# Patient Record
Sex: MALE | Race: WHITE | NOT HISPANIC OR LATINO | Employment: FULL TIME | URBAN - METROPOLITAN AREA
[De-identification: names, ages, dates, MRNs, and addresses within clinical notes are randomized per-mention and may not be internally consistent; named-entity substitution may affect disease eponyms.]

---

## 2024-07-13 ENCOUNTER — APPOINTMENT (OUTPATIENT)
Dept: CT IMAGING | Facility: HOSPITAL | Age: 34
End: 2024-07-13
Payer: MEDICAID

## 2024-07-13 ENCOUNTER — HOSPITAL ENCOUNTER (INPATIENT)
Facility: HOSPITAL | Age: 34
LOS: 3 days | Discharge: HOME | End: 2024-07-16
Attending: EMERGENCY MEDICINE | Admitting: STUDENT IN AN ORGANIZED HEALTH CARE EDUCATION/TRAINING PROGRAM
Payer: MEDICAID

## 2024-07-13 DIAGNOSIS — E87.6 HYPOKALEMIA: ICD-10-CM

## 2024-07-13 DIAGNOSIS — K85.20 ALCOHOL-INDUCED ACUTE PANCREATITIS, UNSPECIFIED COMPLICATION STATUS: Primary | ICD-10-CM

## 2024-07-13 PROBLEM — K85.90 PANCREATITIS, ACUTE: Status: ACTIVE | Noted: 2024-07-13

## 2024-07-13 LAB
ALBUMIN SERPL-MCNC: 4.3 G/DL (ref 3.5–5.2)
ALBUMIN/GLOB SERPL: 1.5 G/DL
ALP SERPL-CCNC: 160 U/L (ref 39–117)
ALT SERPL W P-5'-P-CCNC: 66 U/L (ref 1–41)
ANION GAP SERPL CALCULATED.3IONS-SCNC: 16.9 MMOL/L (ref 5–15)
AST SERPL-CCNC: 110 U/L (ref 1–40)
BACTERIA UR QL AUTO: NORMAL /HPF
BASOPHILS # BLD AUTO: 0.03 10*3/MM3 (ref 0–0.2)
BASOPHILS NFR BLD AUTO: 0.4 % (ref 0–1.5)
BILIRUB SERPL-MCNC: 1.7 MG/DL (ref 0–1.2)
BILIRUB UR QL STRIP: ABNORMAL
BUN SERPL-MCNC: 7 MG/DL (ref 6–20)
BUN/CREAT SERPL: 8.5 (ref 7–25)
CALCIUM SPEC-SCNC: 9.8 MG/DL (ref 8.6–10.5)
CHLORIDE SERPL-SCNC: 101 MMOL/L (ref 98–107)
CLARITY UR: CLEAR
CO2 SERPL-SCNC: 21.1 MMOL/L (ref 22–29)
COLOR UR: ABNORMAL
CREAT SERPL-MCNC: 0.82 MG/DL (ref 0.76–1.27)
DEPRECATED RDW RBC AUTO: 52.1 FL (ref 37–54)
EGFRCR SERPLBLD CKD-EPI 2021: 119.7 ML/MIN/1.73
EOSINOPHIL # BLD AUTO: 0.01 10*3/MM3 (ref 0–0.4)
EOSINOPHIL NFR BLD AUTO: 0.1 % (ref 0.3–6.2)
ERYTHROCYTE [DISTWIDTH] IN BLOOD BY AUTOMATED COUNT: 15.2 % (ref 12.3–15.4)
ETHANOL UR QL: <0.01 %
GLOBULIN UR ELPH-MCNC: 2.9 GM/DL
GLUCOSE SERPL-MCNC: 146 MG/DL (ref 65–99)
GLUCOSE UR STRIP-MCNC: NEGATIVE MG/DL
HCT VFR BLD AUTO: 45.8 % (ref 37.5–51)
HGB BLD-MCNC: 16.9 G/DL (ref 13–17.7)
HGB UR QL STRIP.AUTO: NEGATIVE
HYALINE CASTS UR QL AUTO: NORMAL /LPF
IMM GRANULOCYTES # BLD AUTO: 0.03 10*3/MM3 (ref 0–0.05)
IMM GRANULOCYTES NFR BLD AUTO: 0.4 % (ref 0–0.5)
KETONES UR QL STRIP: ABNORMAL
LEUKOCYTE ESTERASE UR QL STRIP.AUTO: ABNORMAL
LIPASE SERPL-CCNC: 1213 U/L (ref 13–60)
LYMPHOCYTES # BLD AUTO: 1 10*3/MM3 (ref 0.7–3.1)
LYMPHOCYTES NFR BLD AUTO: 12.2 % (ref 19.6–45.3)
MAGNESIUM SERPL-MCNC: 1.4 MG/DL (ref 1.6–2.6)
MCH RBC QN AUTO: 34.3 PG (ref 26.6–33)
MCHC RBC AUTO-ENTMCNC: 36.9 G/DL (ref 31.5–35.7)
MCV RBC AUTO: 93.1 FL (ref 79–97)
MONOCYTES # BLD AUTO: 0.39 10*3/MM3 (ref 0.1–0.9)
MONOCYTES NFR BLD AUTO: 4.8 % (ref 5–12)
NEUTROPHILS NFR BLD AUTO: 6.75 10*3/MM3 (ref 1.7–7)
NEUTROPHILS NFR BLD AUTO: 82.1 % (ref 42.7–76)
NITRITE UR QL STRIP: POSITIVE
NRBC BLD AUTO-RTO: 0 /100 WBC (ref 0–0.2)
PH UR STRIP.AUTO: 6 [PH] (ref 5–8)
PLATELET # BLD AUTO: 135 10*3/MM3 (ref 140–450)
PMV BLD AUTO: 9.6 FL (ref 6–12)
POTASSIUM SERPL-SCNC: 2.9 MMOL/L (ref 3.5–5.2)
PROT SERPL-MCNC: 7.2 G/DL (ref 6–8.5)
PROT UR QL STRIP: ABNORMAL
RBC # BLD AUTO: 4.92 10*6/MM3 (ref 4.14–5.8)
RBC # UR STRIP: NORMAL /HPF
REF LAB TEST METHOD: NORMAL
SODIUM SERPL-SCNC: 139 MMOL/L (ref 136–145)
SP GR UR STRIP: 1.03 (ref 1–1.03)
SQUAMOUS #/AREA URNS HPF: NORMAL /HPF
UROBILINOGEN UR QL STRIP: ABNORMAL
WBC # UR STRIP: NORMAL /HPF
WBC NRBC COR # BLD AUTO: 8.21 10*3/MM3 (ref 3.4–10.8)

## 2024-07-13 PROCEDURE — 80053 COMPREHEN METABOLIC PANEL: CPT | Performed by: EMERGENCY MEDICINE

## 2024-07-13 PROCEDURE — 36415 COLL VENOUS BLD VENIPUNCTURE: CPT

## 2024-07-13 PROCEDURE — 25010000002 ONDANSETRON PER 1 MG: Performed by: EMERGENCY MEDICINE

## 2024-07-13 PROCEDURE — 85025 COMPLETE CBC W/AUTO DIFF WBC: CPT | Performed by: EMERGENCY MEDICINE

## 2024-07-13 PROCEDURE — 25010000002 MORPHINE PER 10 MG: Performed by: EMERGENCY MEDICINE

## 2024-07-13 PROCEDURE — 25810000003 LACTATED RINGERS SOLUTION: Performed by: EMERGENCY MEDICINE

## 2024-07-13 PROCEDURE — 82077 ASSAY SPEC XCP UR&BREATH IA: CPT | Performed by: EMERGENCY MEDICINE

## 2024-07-13 PROCEDURE — 99285 EMERGENCY DEPT VISIT HI MDM: CPT

## 2024-07-13 PROCEDURE — 83690 ASSAY OF LIPASE: CPT | Performed by: EMERGENCY MEDICINE

## 2024-07-13 PROCEDURE — 83735 ASSAY OF MAGNESIUM: CPT | Performed by: EMERGENCY MEDICINE

## 2024-07-13 PROCEDURE — 25510000001 IOPAMIDOL PER 1 ML: Performed by: EMERGENCY MEDICINE

## 2024-07-13 PROCEDURE — 74177 CT ABD & PELVIS W/CONTRAST: CPT

## 2024-07-13 PROCEDURE — 25010000002 HYDROMORPHONE 1 MG/ML SOLUTION: Performed by: EMERGENCY MEDICINE

## 2024-07-13 PROCEDURE — 81001 URINALYSIS AUTO W/SCOPE: CPT | Performed by: EMERGENCY MEDICINE

## 2024-07-13 RX ORDER — SODIUM CHLORIDE 0.9 % (FLUSH) 0.9 %
10 SYRINGE (ML) INJECTION EVERY 12 HOURS SCHEDULED
Status: DISCONTINUED | OUTPATIENT
Start: 2024-07-13 | End: 2024-07-16 | Stop reason: HOSPADM

## 2024-07-13 RX ORDER — BISACODYL 10 MG
10 SUPPOSITORY, RECTAL RECTAL DAILY PRN
Status: DISCONTINUED | OUTPATIENT
Start: 2024-07-13 | End: 2024-07-16 | Stop reason: HOSPADM

## 2024-07-13 RX ORDER — NITROGLYCERIN 0.4 MG/1
0.4 TABLET SUBLINGUAL
Status: DISCONTINUED | OUTPATIENT
Start: 2024-07-13 | End: 2024-07-16 | Stop reason: HOSPADM

## 2024-07-13 RX ORDER — SODIUM CHLORIDE 9 MG/ML
40 INJECTION, SOLUTION INTRAVENOUS AS NEEDED
Status: DISCONTINUED | OUTPATIENT
Start: 2024-07-13 | End: 2024-07-16 | Stop reason: HOSPADM

## 2024-07-13 RX ORDER — FAMOTIDINE 10 MG/ML
20 INJECTION, SOLUTION INTRAVENOUS ONCE
Status: COMPLETED | OUTPATIENT
Start: 2024-07-13 | End: 2024-07-13

## 2024-07-13 RX ORDER — ONDANSETRON 2 MG/ML
4 INJECTION INTRAMUSCULAR; INTRAVENOUS EVERY 6 HOURS PRN
Status: DISCONTINUED | OUTPATIENT
Start: 2024-07-13 | End: 2024-07-16 | Stop reason: HOSPADM

## 2024-07-13 RX ORDER — NICOTINE 21 MG/24HR
1 PATCH, TRANSDERMAL 24 HOURS TRANSDERMAL NIGHTLY
Status: DISCONTINUED | OUTPATIENT
Start: 2024-07-14 | End: 2024-07-16 | Stop reason: HOSPADM

## 2024-07-13 RX ORDER — BISACODYL 5 MG/1
5 TABLET, DELAYED RELEASE ORAL DAILY PRN
Status: DISCONTINUED | OUTPATIENT
Start: 2024-07-13 | End: 2024-07-16 | Stop reason: HOSPADM

## 2024-07-13 RX ORDER — ENOXAPARIN SODIUM 100 MG/ML
40 INJECTION SUBCUTANEOUS DAILY
Status: DISCONTINUED | OUTPATIENT
Start: 2024-07-14 | End: 2024-07-16 | Stop reason: HOSPADM

## 2024-07-13 RX ORDER — AMOXICILLIN 250 MG
2 CAPSULE ORAL 2 TIMES DAILY PRN
Status: DISCONTINUED | OUTPATIENT
Start: 2024-07-13 | End: 2024-07-16 | Stop reason: HOSPADM

## 2024-07-13 RX ORDER — POTASSIUM CHLORIDE 20 MEQ/1
40 TABLET, EXTENDED RELEASE ORAL EVERY 4 HOURS
Qty: 6 TABLET | Refills: 0 | Status: COMPLETED | OUTPATIENT
Start: 2024-07-13 | End: 2024-07-14

## 2024-07-13 RX ORDER — POLYETHYLENE GLYCOL 3350 17 G/17G
17 POWDER, FOR SOLUTION ORAL DAILY PRN
Status: DISCONTINUED | OUTPATIENT
Start: 2024-07-13 | End: 2024-07-16 | Stop reason: HOSPADM

## 2024-07-13 RX ORDER — SODIUM CHLORIDE 0.9 % (FLUSH) 0.9 %
10 SYRINGE (ML) INJECTION AS NEEDED
Status: DISCONTINUED | OUTPATIENT
Start: 2024-07-13 | End: 2024-07-16 | Stop reason: HOSPADM

## 2024-07-13 RX ORDER — ONDANSETRON 2 MG/ML
4 INJECTION INTRAMUSCULAR; INTRAVENOUS ONCE
Status: COMPLETED | OUTPATIENT
Start: 2024-07-13 | End: 2024-07-13

## 2024-07-13 RX ADMIN — POTASSIUM CHLORIDE 40 MEQ: 1500 TABLET, EXTENDED RELEASE ORAL at 22:17

## 2024-07-13 RX ADMIN — SODIUM CHLORIDE, POTASSIUM CHLORIDE, SODIUM LACTATE AND CALCIUM CHLORIDE 1000 ML: 600; 310; 30; 20 INJECTION, SOLUTION INTRAVENOUS at 18:32

## 2024-07-13 RX ADMIN — HYDROMORPHONE HYDROCHLORIDE 1 MG: 1 INJECTION, SOLUTION INTRAMUSCULAR; INTRAVENOUS; SUBCUTANEOUS at 18:33

## 2024-07-13 RX ADMIN — HYDROMORPHONE HYDROCHLORIDE 1 MG: 1 INJECTION, SOLUTION INTRAMUSCULAR; INTRAVENOUS; SUBCUTANEOUS at 23:25

## 2024-07-13 RX ADMIN — MORPHINE SULFATE 4 MG: 4 INJECTION, SOLUTION INTRAMUSCULAR; INTRAVENOUS at 20:42

## 2024-07-13 RX ADMIN — Medication 10 ML: at 23:26

## 2024-07-13 RX ADMIN — ONDANSETRON 4 MG: 2 INJECTION INTRAMUSCULAR; INTRAVENOUS at 18:33

## 2024-07-13 RX ADMIN — IOPAMIDOL 100 ML: 755 INJECTION, SOLUTION INTRAVENOUS at 22:12

## 2024-07-13 RX ADMIN — FAMOTIDINE 20 MG: 10 INJECTION INTRAVENOUS at 18:33

## 2024-07-13 NOTE — ED PROVIDER NOTES
"Subjective   History of Present Illness  34-year-old male with a reported history of recurrent pancreatitis states he thinks he is having another flareup today.  States it occurred about 2 hours ago and is having some epigastric discomfort associated with nausea and vomiting.  He denies diarrhea or melena or hematochezia or hematemesis.  He reports no fevers or chills or unusual ingestions.  He states he did drink alcohol 2 days ago due to it being his birthday but otherwise has been trying to abstain from alcohol.  Review of Systems    No past medical history on file.  Recurrent pancreatitis  No Known Allergies    No past surgical history on file.    No family history on file.    Social History     Socioeconomic History    Marital status: Single     Prior to Admission medications    Not on File     BP (!) 148/106   Pulse 103   Temp 98.7 °F (37.1 °C)   Resp 18   Ht 170.2 cm (67\")   Wt 95.3 kg (210 lb)   SpO2 98%   BMI 32.89 kg/m²         Objective   Physical Exam  General: Well-developed well-appearing, no acute distress, alert and appropriate  Eyes: sclera nonicteric  HEENT: Mucous membranes somewhat dry, no mucosal swelling  Neck: Supple, no nuchal rigidity no JVD  Respirations: Respirations nonlabored, equal breath sounds bilaterally, clear lungs  Heart regular rate and rhythm, no murmurs rubs or gallops,   Abdomen soft, mildly tender palpation epigastrium, no rebound or guarding, nondistended, no hepatosplenomegaly, no hernia, no mass, normal bowel sounds, no CVA tenderness  Extremities no clubbing cyanosis or edema, calves are symmetric and nontender  Neuro cranial nerves grossly intact, no focal limb deficits  Psych oriented, pleasant affect  Skin no rash, brisk cap refill  Procedures           ED Course      Results for orders placed or performed during the hospital encounter of 07/13/24   Comprehensive Metabolic Panel    Specimen: Blood   Result Value Ref Range    Glucose 146 (H) 65 - 99 mg/dL    BUN " 7 6 - 20 mg/dL    Creatinine 0.82 0.76 - 1.27 mg/dL    Sodium 139 136 - 145 mmol/L    Potassium 2.9 (L) 3.5 - 5.2 mmol/L    Chloride 101 98 - 107 mmol/L    CO2 21.1 (L) 22.0 - 29.0 mmol/L    Calcium 9.8 8.6 - 10.5 mg/dL    Total Protein 7.2 6.0 - 8.5 g/dL    Albumin 4.3 3.5 - 5.2 g/dL    ALT (SGPT) 66 (H) 1 - 41 U/L    AST (SGOT) 110 (H) 1 - 40 U/L    Alkaline Phosphatase 160 (H) 39 - 117 U/L    Total Bilirubin 1.7 (H) 0.0 - 1.2 mg/dL    Globulin 2.9 gm/dL    A/G Ratio 1.5 g/dL    BUN/Creatinine Ratio 8.5 7.0 - 25.0    Anion Gap 16.9 (H) 5.0 - 15.0 mmol/L    eGFR 119.7 >60.0 mL/min/1.73   Lipase    Specimen: Blood   Result Value Ref Range    Lipase 1,213 (H) 13 - 60 U/L   Urinalysis With Microscopic If Indicated (No Culture) - Urine, Clean Catch    Specimen: Urine, Clean Catch   Result Value Ref Range    Color, UA [place] (A) Yellow, Straw    Appearance, UA Clear Clear    pH, UA 6.0 5.0 - 8.0    Specific Gravity, UA 1.033 (H) 1.005 - 1.030    Glucose, UA Negative Negative    Ketones, UA Trace (A) Negative    Bilirubin, UA Small (1+) (A) Negative    Blood, UA Negative Negative    Protein,  mg/dL (2+) (A) Negative    Leuk Esterase, UA Small (1+) (A) Negative    Nitrite, UA Positive (A) Negative    Urobilinogen, UA 1.0 E.U./dL 0.2 - 1.0 E.U./dL   Ethanol    Specimen: Blood   Result Value Ref Range    Ethanol % <0.010 %   CBC Auto Differential    Specimen: Blood   Result Value Ref Range    WBC 8.21 3.40 - 10.80 10*3/mm3    RBC 4.92 4.14 - 5.80 10*6/mm3    Hemoglobin 16.9 13.0 - 17.7 g/dL    Hematocrit 45.8 37.5 - 51.0 %    MCV 93.1 79.0 - 97.0 fL    MCH 34.3 (H) 26.6 - 33.0 pg    MCHC 36.9 (H) 31.5 - 35.7 g/dL    RDW 15.2 12.3 - 15.4 %    RDW-SD 52.1 37.0 - 54.0 fl    MPV 9.6 6.0 - 12.0 fL    Platelets 135 (L) 140 - 450 10*3/mm3    Neutrophil % 82.1 (H) 42.7 - 76.0 %    Lymphocyte % 12.2 (L) 19.6 - 45.3 %    Monocyte % 4.8 (L) 5.0 - 12.0 %    Eosinophil % 0.1 (L) 0.3 - 6.2 %    Basophil % 0.4 0.0 - 1.5 %    Immature  Grans % 0.4 0.0 - 0.5 %    Neutrophils, Absolute 6.75 1.70 - 7.00 10*3/mm3    Lymphocytes, Absolute 1.00 0.70 - 3.10 10*3/mm3    Monocytes, Absolute 0.39 0.10 - 0.90 10*3/mm3    Eosinophils, Absolute 0.01 0.00 - 0.40 10*3/mm3    Basophils, Absolute 0.03 0.00 - 0.20 10*3/mm3    Immature Grans, Absolute 0.03 0.00 - 0.05 10*3/mm3    nRBC 0.0 0.0 - 0.2 /100 WBC   Magnesium    Specimen: Blood   Result Value Ref Range    Magnesium 1.4 (L) 1.6 - 2.6 mg/dL   Urinalysis, Microscopic Only - Urine, Clean Catch    Specimen: Urine, Clean Catch   Result Value Ref Range    RBC, UA 0-2 None Seen, 0-2 /HPF    WBC, UA 0-2 None Seen, 0-2 /HPF    Bacteria, UA None Seen None Seen /HPF    Squamous Epithelial Cells, UA 0-2 None Seen, 0-2 /HPF    Hyaline Casts, UA 3-6 None Seen /LPF    Methodology Automated Microscopy      CT Abdomen Pelvis With Contrast    Result Date: 7/13/2024  Impression: 1.Findings consistent with significant acute uncomplicated pancreatitis. No evidence of necrosis or focal drainable collection. No evidence of pancreatic ductal dilatation. 2.Significant hepatic steatosis. No overt features of cirrhosis.. Varicosities are present extending to the stomach likely related to portal hypertension. Mild splenomegaly is present. 3.Ancillary findings as described above. Electronically Signed: Olamide Levine MD  7/13/2024 10:18 PM EDT  Workstation ID: [id number]                                          Medical Decision Making  Differential diagnosis including pancreatitis, cholecystitis, peptic ulcer disease, ischemic bowel, bowel obstruction    Patient was ordered Dilaudid and Zofran for acute pain management.  He was ordered IV fluids.  He had some marginal improvement and was advised of findings.  He was ordered potassium and magnesium replacement.  Lab and CT findings consistent with acute pancreatitis likely related to his recent alcohol consumption.  Patient is agreeable to plan of admission for further care.  Case discussed  dominick Gonsalez with the hospitalist service who is agreeable plan of admission.            Problems Addressed:  Alcohol-induced acute pancreatitis, unspecified complication status: complicated acute illness or injury  Hypokalemia: complicated acute illness or injury    Amount and/or Complexity of Data Reviewed  Labs: ordered. Decision-making details documented in ED Course.     Details: Lipase elevated, if enzymes elevated likely related to his alcohol consumption, magnesium and potassium are low, urinalysis negative for bacteria, CBC shows no leukocytosis, there is mild thrombocytopenia  Radiology: ordered and independent interpretation performed.     Details: My independent interpretation of CT abdomen image peripancreatic inflammatory process, no bowel obstruction    Risk  OTC drugs.  Prescription drug management.  Decision regarding hospitalization.        Final diagnoses:   Alcohol-induced acute pancreatitis, unspecified complication status   Hypokalemia       ED Disposition  ED Disposition       ED Disposition   Decision to Admit    Condition   --    Comment   Level of Care: Telemetry [5]   Admitting Physician: LLANES ALVAREZ, CARLOS [[id number]]   Attending Physician: LLANES ALVAREZ, CARLOS [[id number]]                 No follow-up provider specified.       Medication List      No changes were made to your prescriptions during this visit.            Thanh Brown MD  07/13/24 2413       Thanh Brown MD  07/13/24 7942

## 2024-07-13 NOTE — Clinical Note
Level of Care: Telemetry [5]   Admitting Physician: LLANES ALVAREZ, CARLOS [[id number]]   Attending Physician: LLANES ALVAREZ, CARLOS [[id number]]

## 2024-07-14 LAB
ALBUMIN SERPL-MCNC: 3.7 G/DL (ref 3.5–5.2)
ALBUMIN/GLOB SERPL: 1.5 G/DL
ALP SERPL-CCNC: 131 U/L (ref 39–117)
ALT SERPL W P-5'-P-CCNC: 48 U/L (ref 1–41)
ANION GAP SERPL CALCULATED.3IONS-SCNC: 11.6 MMOL/L (ref 5–15)
AST SERPL-CCNC: 62 U/L (ref 1–40)
BASOPHILS # BLD AUTO: 0.01 10*3/MM3 (ref 0–0.2)
BASOPHILS NFR BLD AUTO: 0.2 % (ref 0–1.5)
BILIRUB SERPL-MCNC: 2.3 MG/DL (ref 0–1.2)
BUN SERPL-MCNC: 8 MG/DL (ref 6–20)
BUN/CREAT SERPL: 12.1 (ref 7–25)
CALCIUM SPEC-SCNC: 8.7 MG/DL (ref 8.6–10.5)
CHLORIDE SERPL-SCNC: 100 MMOL/L (ref 98–107)
CO2 SERPL-SCNC: 21.4 MMOL/L (ref 22–29)
CREAT SERPL-MCNC: 0.66 MG/DL (ref 0.76–1.27)
DEPRECATED RDW RBC AUTO: 55.4 FL (ref 37–54)
EGFRCR SERPLBLD CKD-EPI 2021: 127.8 ML/MIN/1.73
EOSINOPHIL # BLD AUTO: 0.02 10*3/MM3 (ref 0–0.4)
EOSINOPHIL NFR BLD AUTO: 0.3 % (ref 0.3–6.2)
ERYTHROCYTE [DISTWIDTH] IN BLOOD BY AUTOMATED COUNT: 15.9 % (ref 12.3–15.4)
GLOBULIN UR ELPH-MCNC: 2.4 GM/DL
GLUCOSE SERPL-MCNC: 153 MG/DL (ref 65–99)
HCT VFR BLD AUTO: 45 % (ref 37.5–51)
HGB BLD-MCNC: 15.8 G/DL (ref 13–17.7)
IMM GRANULOCYTES # BLD AUTO: 0.02 10*3/MM3 (ref 0–0.05)
IMM GRANULOCYTES NFR BLD AUTO: 0.3 % (ref 0–0.5)
LIPASE SERPL-CCNC: 1157 U/L (ref 13–60)
LYMPHOCYTES # BLD AUTO: 1.04 10*3/MM3 (ref 0.7–3.1)
LYMPHOCYTES NFR BLD AUTO: 17.2 % (ref 19.6–45.3)
MCH RBC QN AUTO: 33.7 PG (ref 26.6–33)
MCHC RBC AUTO-ENTMCNC: 35.1 G/DL (ref 31.5–35.7)
MCV RBC AUTO: 95.9 FL (ref 79–97)
MONOCYTES # BLD AUTO: 0.31 10*3/MM3 (ref 0.1–0.9)
MONOCYTES NFR BLD AUTO: 5.1 % (ref 5–12)
NEUTROPHILS NFR BLD AUTO: 4.64 10*3/MM3 (ref 1.7–7)
NEUTROPHILS NFR BLD AUTO: 76.9 % (ref 42.7–76)
NRBC BLD AUTO-RTO: 0 /100 WBC (ref 0–0.2)
PLATELET # BLD AUTO: 107 10*3/MM3 (ref 140–450)
PMV BLD AUTO: 9.9 FL (ref 6–12)
POTASSIUM SERPL-SCNC: 4 MMOL/L (ref 3.5–5.2)
PROT SERPL-MCNC: 6.1 G/DL (ref 6–8.5)
RBC # BLD AUTO: 4.69 10*6/MM3 (ref 4.14–5.8)
SODIUM SERPL-SCNC: 133 MMOL/L (ref 136–145)
WBC NRBC COR # BLD AUTO: 6.04 10*3/MM3 (ref 3.4–10.8)

## 2024-07-14 PROCEDURE — 25010000002 MAGNESIUM SULFATE 2 GM/50ML SOLUTION: Performed by: STUDENT IN AN ORGANIZED HEALTH CARE EDUCATION/TRAINING PROGRAM

## 2024-07-14 PROCEDURE — 25810000003 SODIUM CHLORIDE 0.9 % SOLUTION: Performed by: NURSE PRACTITIONER

## 2024-07-14 PROCEDURE — 25010000002 ENOXAPARIN PER 10 MG: Performed by: STUDENT IN AN ORGANIZED HEALTH CARE EDUCATION/TRAINING PROGRAM

## 2024-07-14 PROCEDURE — 83690 ASSAY OF LIPASE: CPT | Performed by: STUDENT IN AN ORGANIZED HEALTH CARE EDUCATION/TRAINING PROGRAM

## 2024-07-14 PROCEDURE — 25010000002 ONDANSETRON PER 1 MG: Performed by: STUDENT IN AN ORGANIZED HEALTH CARE EDUCATION/TRAINING PROGRAM

## 2024-07-14 PROCEDURE — 85025 COMPLETE CBC W/AUTO DIFF WBC: CPT | Performed by: STUDENT IN AN ORGANIZED HEALTH CARE EDUCATION/TRAINING PROGRAM

## 2024-07-14 PROCEDURE — 25010000002 HYDROMORPHONE 1 MG/ML SOLUTION: Performed by: STUDENT IN AN ORGANIZED HEALTH CARE EDUCATION/TRAINING PROGRAM

## 2024-07-14 PROCEDURE — 80053 COMPREHEN METABOLIC PANEL: CPT | Performed by: STUDENT IN AN ORGANIZED HEALTH CARE EDUCATION/TRAINING PROGRAM

## 2024-07-14 RX ORDER — MAGNESIUM SULFATE HEPTAHYDRATE 40 MG/ML
2 INJECTION, SOLUTION INTRAVENOUS
Status: COMPLETED | OUTPATIENT
Start: 2024-07-14 | End: 2024-07-14

## 2024-07-14 RX ORDER — MAGNESIUM SULFATE HEPTAHYDRATE 40 MG/ML
2 INJECTION, SOLUTION INTRAVENOUS
Status: CANCELLED | OUTPATIENT
Start: 2024-07-14 | End: 2024-07-14

## 2024-07-14 RX ORDER — SODIUM CHLORIDE 9 MG/ML
100 INJECTION, SOLUTION INTRAVENOUS CONTINUOUS
Status: DISCONTINUED | OUTPATIENT
Start: 2024-07-14 | End: 2024-07-15

## 2024-07-14 RX ADMIN — HYDROMORPHONE HYDROCHLORIDE 0.5 MG: 1 INJECTION, SOLUTION INTRAMUSCULAR; INTRAVENOUS; SUBCUTANEOUS at 20:32

## 2024-07-14 RX ADMIN — HYDROMORPHONE HYDROCHLORIDE 0.5 MG: 1 INJECTION, SOLUTION INTRAMUSCULAR; INTRAVENOUS; SUBCUTANEOUS at 13:24

## 2024-07-14 RX ADMIN — MAGNESIUM SULFATE HEPTAHYDRATE 2 G: 2 INJECTION, SOLUTION INTRAVENOUS at 01:25

## 2024-07-14 RX ADMIN — HYDROMORPHONE HYDROCHLORIDE 0.5 MG: 1 INJECTION, SOLUTION INTRAMUSCULAR; INTRAVENOUS; SUBCUTANEOUS at 01:24

## 2024-07-14 RX ADMIN — HYDROMORPHONE HYDROCHLORIDE 0.5 MG: 1 INJECTION, SOLUTION INTRAMUSCULAR; INTRAVENOUS; SUBCUTANEOUS at 04:33

## 2024-07-14 RX ADMIN — HYDROMORPHONE HYDROCHLORIDE 0.5 MG: 1 INJECTION, SOLUTION INTRAMUSCULAR; INTRAVENOUS; SUBCUTANEOUS at 17:49

## 2024-07-14 RX ADMIN — SODIUM CHLORIDE 100 ML/HR: 9 INJECTION, SOLUTION INTRAVENOUS at 01:34

## 2024-07-14 RX ADMIN — POTASSIUM CHLORIDE 40 MEQ: 1500 TABLET, EXTENDED RELEASE ORAL at 03:20

## 2024-07-14 RX ADMIN — POTASSIUM CHLORIDE 40 MEQ: 1500 TABLET, EXTENDED RELEASE ORAL at 05:31

## 2024-07-14 RX ADMIN — ONDANSETRON 4 MG: 2 INJECTION INTRAMUSCULAR; INTRAVENOUS at 07:55

## 2024-07-14 RX ADMIN — Medication 1 PATCH: at 01:25

## 2024-07-14 RX ADMIN — SODIUM CHLORIDE 100 ML/HR: 9 INJECTION, SOLUTION INTRAVENOUS at 18:03

## 2024-07-14 RX ADMIN — Medication 1 PATCH: at 20:31

## 2024-07-14 RX ADMIN — Medication 10 ML: at 07:55

## 2024-07-14 RX ADMIN — SODIUM CHLORIDE 100 ML/HR: 9 INJECTION, SOLUTION INTRAVENOUS at 07:49

## 2024-07-14 RX ADMIN — MAGNESIUM SULFATE HEPTAHYDRATE 2 G: 2 INJECTION, SOLUTION INTRAVENOUS at 05:32

## 2024-07-14 RX ADMIN — HYDROMORPHONE HYDROCHLORIDE 0.5 MG: 1 INJECTION, SOLUTION INTRAMUSCULAR; INTRAVENOUS; SUBCUTANEOUS at 07:55

## 2024-07-14 RX ADMIN — ONDANSETRON 4 MG: 2 INJECTION INTRAMUSCULAR; INTRAVENOUS at 01:34

## 2024-07-14 RX ADMIN — Medication 10 ML: at 07:58

## 2024-07-14 RX ADMIN — HYDROMORPHONE HYDROCHLORIDE 0.5 MG: 1 INJECTION, SOLUTION INTRAMUSCULAR; INTRAVENOUS; SUBCUTANEOUS at 23:57

## 2024-07-14 RX ADMIN — ENOXAPARIN SODIUM 40 MG: 100 INJECTION SUBCUTANEOUS at 01:25

## 2024-07-14 RX ADMIN — MAGNESIUM SULFATE HEPTAHYDRATE 2 G: 2 INJECTION, SOLUTION INTRAVENOUS at 03:20

## 2024-07-14 NOTE — PLAN OF CARE
Goal Outcome Evaluation:  Plan of Care Reviewed With: patient        Progress: improving  Outcome Evaluation: 34M with intermittent upper ABD pain secondary to pancreatitis, see MAR. Patient NPO with ice chips. No N/V. Declined Lovenox, educated about DVT prevention. Will continue to monitor.

## 2024-07-14 NOTE — H&P
[facility] Medicine Services    Hospitalist History and Physical     Juvenal Vance : 1990 MRN:[id number] LOS:1 ROOM: 36/     Reason for admission: Pancreatitis, acute     Assessment / Plan     Abdominal pain  2/2 to recurrent acute pancreatitis  Alcohol induced  Metabolic acidosis  - Co2 21.1, Anion Gap 16.9   - IVF hydration   -CT abdomen: 1.Findings consistent with significant acute uncomplicated pancreatitis. No evidence of necrosis or focal drainable collection. No evidence of pancreatic ductal dilatation.  - denies daily ETOH use. States had consumed alcohol for his B-day 2 days ago and pain started acutely today  - Lipase 1,213  - analgesics prn  - antiemetics prn  - GI consult    Elevated liver enzymes  - CT abdomen: Significant hepatic steatosis. No overt features of cirrhosis.. Varicosities are present extending to the stomach likely related to portal hypertension. Mild splenomegaly is present.  - unknown baseline. Unable to view records from outside hospital to compare previous admission for same  -   - ALT 66  - bilirubin 1.7  - NPO   - GI to follow     Electrolyte imbalance  Hypokalemia  Hypomagnesemia  - replace per protocol      HTN, acute   - likely 2/2 to pain  - will monitor     Tobacco dependency  - nicotine patch       Nutrition:   NPO Diet NPO Type: Sips with Meds, Ice Chips     VTE Prophylaxis:  Pharmacologic VTE prophylaxis orders are present.         History of Present illness     Juvenal Vance is a 34 y.o. male with no significant PMH  presented to the hospital on 24, and was admitted with a principal diagnosis of Pancreatitis, acute.     Reports with hospitalized 3 weeks ago at [facility] for acute pancreatitis.  He reports had discontinued alcohol use until his birthday 2 days ago when he had shared a 5th with some friends.  He states no other alcohol use.  Today had acute onset of mid epigastric abdominal pain that did not radiate.   Positive for nausea and episode of vomiting x 1.  CT of the abdomen is positive for acute pancreatitis correlates with elevated liver enzymes.  Provide analgesics antiemetics and IV fluid hydration.  GI to consult    Patient was seen and examined on 07/13/24 at 01:19 EDT .    Subjective / Review of systems     Review of Systems   Constitutional:  Positive for appetite change.   Gastrointestinal:  Positive for abdominal pain (Epigastric).        Past Medical/Surgical/Social/Family History & Allergies     History reviewed. No pertinent past medical history.   History reviewed. No pertinent surgical history.   Social History     Socioeconomic History    Marital status: Single   Tobacco Use    Smoking status: Every Day     Current packs/day: 0.50     Average packs/day: 0.5 packs/day for 5.5 years (2.8 ttl pk-yrs)     Types: Cigarettes     Start date: 2019   Substance and Sexual Activity    Drug use: Never      History reviewed. No pertinent family history.   No Known Allergies   Social Determinants of Health     Tobacco Use: High Risk (7/14/2024)    Patient History     Smoking Tobacco Use: Every Day     Smokeless Tobacco Use: Unknown     Passive Exposure: Not on file   Alcohol Use: Not At Risk (7/14/2024)    AUDIT-C     Frequency of Alcohol Consumption: Monthly or less     Average Number of Drinks: 1 or 2     Frequency of Binge Drinking: Less than monthly   Financial Resource Strain: Not on file   Food Insecurity: Not on file   Transportation Needs: Not on file   Physical Activity: Not on file   Stress: Not on file   Social Connections: Unknown (10/12/2023)    Family and Community Support     Help with Day-to-Day Activities: Not on file     Lonely or Isolated: Not on file   Interpersonal Safety: Not At Risk (7/13/2024)    Abuse Screen     Unsafe at Home or Work/School: no     Feels Threatened by Someone?: no     Does Anyone Keep You from Contacting Others or Doint Things Outside the Home?: no     Physical Sign of Abuse  Present: no   Depression: Not on file   Housing Stability: Unknown (10/12/2023)    Housing Stability     Current Living Arrangements: Not on file     Potentially Unsafe Housing Conditions: Not on file   Utilities: Not on file   Health Literacy: Unknown (10/12/2023)    Education     Help with school or training?: Not on file     Preferred Language: Not on file   Employment: Unknown (10/12/2023)    Employment     Do you want help finding or keeping work or a job?: Not on file   Disabilities: Not At Risk (7/14/2024)    Disabilities     Concentrating, Remembering, or Making Decisions Difficulty: no     Doing Errands Independently Difficulty: no        Home Medications     Prior to Admission medications    Not on File        Objective / Physical Exam     Vital signs:  Temp: 98.9 °F (37.2 °C)  BP: 159/98  Heart Rate: 104  Resp: 20  SpO2: 98 %  Weight: 95.3 kg (210 lb)    Admission Weight: Weight: 95.3 kg (210 lb)    Physical Exam  Constitutional:       Appearance: He is ill-appearing.   Eyes:      Pupils: Pupils are equal, round, and reactive to light.   Cardiovascular:      Rate and Rhythm: Regular rhythm. Tachycardia present.   Pulmonary:      Effort: Pulmonary effort is normal.      Breath sounds: Normal breath sounds.   Abdominal:      Tenderness: There is abdominal tenderness.   Musculoskeletal:         General: Normal range of motion.   Neurological:      Mental Status: He is alert and oriented to person, place, and time.   Psychiatric:         Behavior: Behavior normal.            Labs     Results from last 7 days   Lab Units 07/13/24  1829   WBC 10*3/mm3 8.21   HEMOGLOBIN g/dL 16.9   HEMATOCRIT % 45.8   PLATELETS 10*3/mm3 135*      Results from last 7 days   Lab Units 07/13/24  1829   ALK PHOS U/L 160*   AST (SGOT) U/L 110*   ALT (SGPT) U/L 66*           Results from last 7 days   Lab Units 07/13/24  1829   SODIUM mmol/L 139   POTASSIUM mmol/L 2.9*   CHLORIDE mmol/L 101   CO2 mmol/L 21.1*   BUN mg/dL 7    CREATININE mg/dL 0.82   GLUCOSE mg/dL 146*        Imaging     CT Abdomen Pelvis With Contrast    Result Date: 7/13/2024  CT ABDOMEN PELVIS W CONTRAST Date of Exam: 7/13/2024 9:58 PM EDT Indication: pain. Comparison: None available. Technique: Axial CT images were obtained of the abdomen and pelvis following the uneventful intravenous administration of iodinated contrast. Sagittal and coronal reconstructions were performed.  Automated exposure control and iterative reconstruction methods were used. Findings: Lung Bases:   The visualized lung bases and lower mediastinal structures are unremarkable. Liver: The liver appears significantly hypodense.. No focal lesions. Varicosities extending to the stomach are likely present. No definite surface nodularity identified. Biliary/Gallbladder:  The gallbladder is normal without evidence of radiopaque stones. The biliary tree is nondilated. Spleen: The spleen appears mildly enlarged measuring up to 15 cm.. Pancreas:  Significant inflammatory changes are seen surrounding the pancreatic body head and neck. Diffuse free fluid is present. There is encasement of the second and third portion of the duodenum which demonstrates wall thickening which is likely reactive. No focal drainable collection identified. No evidence of air. No obvious mass identified. No evidence of pancreatic ductal dilatation. Kidneys:  Kidneys are normal in size. There are no stones or hydronephrosis. Adrenals:  Adrenal glands are unremarkable. Retroperitoneal/Lymph Nodes/Vasculature:  No retroperitoneal adenopathy is identified. Gastrointestinal/Mesentery:  The bowel loops are non-dilated without wall thickening or mass. The appendix appears within normal limits. No evidence of obstruction. No free air. No mesenteric fluid collections identified. No significant stool burden. No evidence of hernia. Bladder:  The bladder is normal. Genital:   Unremarkable       Bony Structures:   Visualized bony structures  are consistent with the patient's age.     Impression: 1.Findings consistent with significant acute uncomplicated pancreatitis. No evidence of necrosis or focal drainable collection. No evidence of pancreatic ductal dilatation. 2.Significant hepatic steatosis. No overt features of cirrhosis.. Varicosities are present extending to the stomach likely related to portal hypertension. Mild splenomegaly is present. 3.Ancillary findings as described above. Electronically Signed: Olamide Levine MD  7/13/2024 10:18 PM EDT  Workstation ID: [id number]      No orders to display        Current Medications     Scheduled Meds:  enoxaparin, 40 mg, Subcutaneous, Daily  magnesium sulfate, 2 g, Intravenous, Q2H  nicotine, 1 patch, Transdermal, Nightly  potassium chloride ER, 40 mEq, Oral, Q4H  sodium chloride, 10 mL, Intravenous, Q12H         Continuous Infusions:  sodium chloride, 100 mL/hr           Sunshine Manuel [facility]  07/14/24   01:19 EDT

## 2024-07-14 NOTE — PROGRESS NOTES
[facility] MEDICINE SERVICE  DAILY PROGRESS NOTE    NAME: Juvenal Vance  : 1990  MRN: [id number]      LOS: 1 day     PROVIDER OF SERVICE: Otilia Butler MD    Chief Complaint: Pancreatitis, acute    History of Present Illness:     Subjective:     Interval History:      Review of Systems:   Negative except what is listed above     Objective:     Vital Signs  Temp:  [97.5 °F (36.4 °C)-98.9 °F (37.2 °C)] 97.5 °F (36.4 °C)  Heart Rate:  [] 84  Resp:  [14-22] 16  BP: (118-159)/() 154/100   Body mass index is 32.1 kg/m².    Physical Exam    General Appearance:    Alert, cooperative, in no acute distress   Head:    Normocephalic, without obvious abnormality, atraumatic   Eyes:            conjunctivae and sclerae normal, no   icterus, no pallor, corneas  clear, PERRLA   Neck:   No adenopathy, supple, trachea midline, no thyromegaly, no   carotid bruit, no JVD   Lungs:     Clear to auscultation,respirations regular, even and                  unlabored    Heart:    Regular rhythm and normal rate, normal S1 and S2, no            murmur, no gallop, no rub, no click   Abdomen:     Normal bowel sounds, no masses, no organomegaly, soft        non-tender, non-distended, no guarding, no rebound                No tenderness   Extremities:   Moves all extremities well, no edema, no cyanosis, no             redness   Lymph nodes:   No palpable adenopathy   Neurologic:   Cranial nerves 2 - 12 grossly intact, sensation intact, DTR       present and equal bilaterally       Scheduled Meds   enoxaparin, 40 mg, Subcutaneous, Daily  nicotine, 1 patch, Transdermal, Nightly  sodium chloride, 10 mL, Intravenous, Q12H       PRN Meds     senna-docusate sodium **AND** polyethylene glycol **AND** bisacodyl **AND** bisacodyl    HYDROmorphone    Magnesium Standard Dose Replacement - Follow Nurse / BPA Driven Protocol    nitroglycerin    ondansetron    Potassium Replacement - Follow Nurse / BPA Driven Protocol     [COMPLETED] Insert Peripheral IV **AND** sodium chloride    sodium chloride    sodium chloride   Infusions  sodium chloride, 100 mL/hr, Last Rate: 100 mL/hr (07/14/24 4816)          Diagnostic Data    Results from last 7 days   Lab Units 07/14/24  0538   WBC 10*3/mm3 6.04   HEMOGLOBIN g/dL 15.8   HEMATOCRIT % 45.0   PLATELETS 10*3/mm3 107*   GLUCOSE mg/dL 153*   CREATININE mg/dL 0.66*   BUN mg/dL 8   SODIUM mmol/L 133*   POTASSIUM mmol/L 4.0   AST (SGOT) U/L 62*   ALT (SGPT) U/L 48*   ALK PHOS U/L 131*   BILIRUBIN mg/dL 2.3*   ANION GAP mmol/L 11.6       CT Abdomen Pelvis With Contrast    Result Date: 7/13/2024  Impression: 1.Findings consistent with significant acute uncomplicated pancreatitis. No evidence of necrosis or focal drainable collection. No evidence of pancreatic ductal dilatation. 2.Significant hepatic steatosis. No overt features of cirrhosis.. Varicosities are present extending to the stomach likely related to portal hypertension. Mild splenomegaly is present. 3.Ancillary findings as described above. Electronically Signed: Olamide Levine MD  7/13/2024 10:18 PM EDT  Workstation ID: [id number]       I have personally reviewed the patient's new results.     Assessment/Plan:     Active and Resolved Problems    Acute alcohol induced pancreatitis  History of binge drinking  Transaminitis  Hypokalemia  Hypertension  Morbid obesity  Smoker    Suggestion:    7/14-at this time I will continue current supportive care.  Continue IV hydration and pain management.  Counseled about smoking cessation.  Counseled about alcohol abuse    VTE Prophylaxis:  Pharmacologic VTE prophylaxis orders are present.         Code status is   Code Status and Medical Interventions:   Ordered at: 07/13/24 1821     Level Of Support Discussed With:    Patient     Code Status (Patient has no pulse and is not breathing):    CPR (Attempt to Resuscitate)     Medical Interventions (Patient has pulse or is breathing):    Full Support       Plan  for disposition: 7/16    Time: 30 minutes    Signature: Electronically signed by Otilia Butler MD, 07/14/24, 11:52 EDT.  [facility], [place], operated by [facility] Hospitalist Team

## 2024-07-15 LAB
ALBUMIN SERPL-MCNC: 3.5 G/DL (ref 3.5–5.2)
ALBUMIN/GLOB SERPL: 1.5 G/DL
ALP SERPL-CCNC: 121 U/L (ref 39–117)
ALT SERPL W P-5'-P-CCNC: 38 U/L (ref 1–41)
ANION GAP SERPL CALCULATED.3IONS-SCNC: 8.1 MMOL/L (ref 5–15)
AST SERPL-CCNC: 61 U/L (ref 1–40)
BASOPHILS # BLD AUTO: 0.01 10*3/MM3 (ref 0–0.2)
BASOPHILS NFR BLD AUTO: 0.3 % (ref 0–1.5)
BILIRUB SERPL-MCNC: 2.8 MG/DL (ref 0–1.2)
BILIRUB UR QL STRIP: NEGATIVE
BUN SERPL-MCNC: 8 MG/DL (ref 6–20)
BUN/CREAT SERPL: 11.8 (ref 7–25)
CALCIUM SPEC-SCNC: 8.4 MG/DL (ref 8.6–10.5)
CHLORIDE SERPL-SCNC: 102 MMOL/L (ref 98–107)
CLARITY UR: CLEAR
CO2 SERPL-SCNC: 23.9 MMOL/L (ref 22–29)
COLOR UR: YELLOW
CREAT SERPL-MCNC: 0.68 MG/DL (ref 0.76–1.27)
CRP SERPL-MCNC: 10.2 MG/DL (ref 0–0.5)
DEPRECATED RDW RBC AUTO: 55.2 FL (ref 37–54)
EGFRCR SERPLBLD CKD-EPI 2021: 126.7 ML/MIN/1.73
EOSINOPHIL # BLD AUTO: 0.03 10*3/MM3 (ref 0–0.4)
EOSINOPHIL NFR BLD AUTO: 0.9 % (ref 0.3–6.2)
ERYTHROCYTE [DISTWIDTH] IN BLOOD BY AUTOMATED COUNT: 15.3 % (ref 12.3–15.4)
ERYTHROCYTE [SEDIMENTATION RATE] IN BLOOD: 5 MM/HR (ref 0–15)
GLOBULIN UR ELPH-MCNC: 2.4 GM/DL
GLUCOSE SERPL-MCNC: 101 MG/DL (ref 65–99)
GLUCOSE UR STRIP-MCNC: NEGATIVE MG/DL
HCT VFR BLD AUTO: 36.5 % (ref 37.5–51)
HGB BLD-MCNC: 12.5 G/DL (ref 13–17.7)
HGB UR QL STRIP.AUTO: NEGATIVE
IMM GRANULOCYTES # BLD AUTO: 0.01 10*3/MM3 (ref 0–0.05)
IMM GRANULOCYTES NFR BLD AUTO: 0.3 % (ref 0–0.5)
KETONES UR QL STRIP: NEGATIVE
LEUKOCYTE ESTERASE UR QL STRIP.AUTO: NEGATIVE
LIPASE SERPL-CCNC: 475 U/L (ref 13–60)
LYMPHOCYTES # BLD AUTO: 0.94 10*3/MM3 (ref 0.7–3.1)
LYMPHOCYTES NFR BLD AUTO: 29.7 % (ref 19.6–45.3)
MAGNESIUM SERPL-MCNC: 2 MG/DL (ref 1.6–2.6)
MCH RBC QN AUTO: 34 PG (ref 26.6–33)
MCHC RBC AUTO-ENTMCNC: 34.2 G/DL (ref 31.5–35.7)
MCV RBC AUTO: 99.2 FL (ref 79–97)
MONOCYTES # BLD AUTO: 0.23 10*3/MM3 (ref 0.1–0.9)
MONOCYTES NFR BLD AUTO: 7.3 % (ref 5–12)
NEUTROPHILS NFR BLD AUTO: 1.95 10*3/MM3 (ref 1.7–7)
NEUTROPHILS NFR BLD AUTO: 61.5 % (ref 42.7–76)
NITRITE UR QL STRIP: NEGATIVE
NRBC BLD AUTO-RTO: 0 /100 WBC (ref 0–0.2)
PH UR STRIP.AUTO: 7.5 [PH] (ref 5–8)
PLATELET # BLD AUTO: 71 10*3/MM3 (ref 140–450)
PMV BLD AUTO: 10.5 FL (ref 6–12)
POTASSIUM SERPL-SCNC: 4.3 MMOL/L (ref 3.5–5.2)
PROT SERPL-MCNC: 5.9 G/DL (ref 6–8.5)
PROT UR QL STRIP: NEGATIVE
RBC # BLD AUTO: 3.68 10*6/MM3 (ref 4.14–5.8)
SODIUM SERPL-SCNC: 134 MMOL/L (ref 136–145)
SP GR UR STRIP: <=1.005 (ref 1–1.03)
UROBILINOGEN UR QL STRIP: ABNORMAL
WBC NRBC COR # BLD AUTO: 3.17 10*3/MM3 (ref 3.4–10.8)

## 2024-07-15 PROCEDURE — 83735 ASSAY OF MAGNESIUM: CPT | Performed by: STUDENT IN AN ORGANIZED HEALTH CARE EDUCATION/TRAINING PROGRAM

## 2024-07-15 PROCEDURE — 85025 COMPLETE CBC W/AUTO DIFF WBC: CPT | Performed by: INTERNAL MEDICINE

## 2024-07-15 PROCEDURE — 83690 ASSAY OF LIPASE: CPT | Performed by: INTERNAL MEDICINE

## 2024-07-15 PROCEDURE — 86140 C-REACTIVE PROTEIN: CPT | Performed by: INTERNAL MEDICINE

## 2024-07-15 PROCEDURE — 81003 URINALYSIS AUTO W/O SCOPE: CPT

## 2024-07-15 PROCEDURE — 25010000002 CEFTRIAXONE PER 250 MG: Performed by: FAMILY MEDICINE

## 2024-07-15 PROCEDURE — 80053 COMPREHEN METABOLIC PANEL: CPT | Performed by: INTERNAL MEDICINE

## 2024-07-15 PROCEDURE — 25010000002 HYDROMORPHONE 1 MG/ML SOLUTION: Performed by: STUDENT IN AN ORGANIZED HEALTH CARE EDUCATION/TRAINING PROGRAM

## 2024-07-15 PROCEDURE — 85652 RBC SED RATE AUTOMATED: CPT | Performed by: INTERNAL MEDICINE

## 2024-07-15 RX ORDER — HYDROCODONE BITARTRATE AND ACETAMINOPHEN 5; 325 MG/1; MG/1
1 TABLET ORAL EVERY 6 HOURS PRN
Status: DISCONTINUED | OUTPATIENT
Start: 2024-07-15 | End: 2024-07-16 | Stop reason: HOSPADM

## 2024-07-15 RX ORDER — PANTOPRAZOLE SODIUM 40 MG/1
40 TABLET, DELAYED RELEASE ORAL
Status: DISCONTINUED | OUTPATIENT
Start: 2024-07-15 | End: 2024-07-16 | Stop reason: HOSPADM

## 2024-07-15 RX ADMIN — HYDROMORPHONE HYDROCHLORIDE 0.5 MG: 1 INJECTION, SOLUTION INTRAMUSCULAR; INTRAVENOUS; SUBCUTANEOUS at 20:48

## 2024-07-15 RX ADMIN — HYDROCODONE BITARTRATE AND ACETAMINOPHEN 1 TABLET: 5; 325 TABLET ORAL at 16:34

## 2024-07-15 RX ADMIN — Medication 1 PATCH: at 20:48

## 2024-07-15 RX ADMIN — Medication 10 ML: at 20:49

## 2024-07-15 RX ADMIN — CEFTRIAXONE 2000 MG: 2 INJECTION, POWDER, FOR SOLUTION INTRAMUSCULAR; INTRAVENOUS at 09:21

## 2024-07-15 RX ADMIN — HYDROMORPHONE HYDROCHLORIDE 0.5 MG: 1 INJECTION, SOLUTION INTRAMUSCULAR; INTRAVENOUS; SUBCUTANEOUS at 07:04

## 2024-07-15 RX ADMIN — PANTOPRAZOLE SODIUM 40 MG: 40 TABLET, DELAYED RELEASE ORAL at 16:34

## 2024-07-15 NOTE — DISCHARGE INSTR - APPOINTMENTS
Please arrive to Primary Care Appointment-  Address-[address] [place] [address]  Date: 7-  Time: 3 pm.  Phone: [phone]

## 2024-07-15 NOTE — PAYOR COMM NOTE
"    =================  Unable to submit case through [employer] website.     -----    AUTHORIZATION PENDING:   PLEASE FAX OR CALL DETERMINATION TO CONTACT BELOW:       THANK YOU,    RYDER Rangel, RN  Utilization Review  [facility]  Phone: [phone]  Fax: [phone]      NPI: [id number]  Tax ID: [id number]      Kinza Regalado (34 y.o. Male)       Date of Birth   1990    Social Security Number       Address   [address] [facility] [place] [address]    Home Phone   [phone]    MRN   [id number]       Restorationism   None    Marital Status   Single                            Admission Date   7/13/24    Admission Type   Emergency    Admitting Provider   Llanes Alvarez, Carlos, MD    Attending Provider   Greg Thompson MD    Department, Room/Bed   [facility], 1116/1       Discharge Date       Discharge Disposition       Discharge Destination                                 Attending Provider: Greg Thompson MD    Allergies: No Known Allergies    Isolation: None   Infection: None   Code Status: CPR    Ht: 170.2 cm (67.01\")   Wt: 93 kg (205 lb 0.4 oz)    Admission Cmt: None   Principal Problem: Pancreatitis, acute [K85.90]                   Active Insurance as of 7/13/2024       Primary Coverage       Payor Plan Insurance Group Employer/Plan Group    [place]        Payor Plan Address Payor Plan Phone Number Payor Plan Fax Number Effective Dates    [address]   7/13/2024 - 7/14/2024    [facility] [address]         Subscriber Name Subscriber Birth Date Member ID       KINZA REGALADO 1990 [id number]                     Emergency Contacts        (Rel.) Home Phone Work Phone Mobile Phone    AUSTIN PAYNE (Significant Other) -- -- [phone]                 History & Physical        Sunshine Manuel APRN at 07/13/24 2300       Attestation signed by Llanes Alvarez, Carlos, MD at 07/14/24 9091    I have reviewed this " documentation and agree.                      [facility] Medicine Services    Hospitalist History and Physical     Juvenal Vance : 1990 MRN:[id number] LOS:1 ROOM: 36/36     Reason for admission: Pancreatitis, acute     Assessment / Plan     Abdominal pain  2/2 to recurrent acute pancreatitis  Alcohol induced  Metabolic acidosis  - Co2 21.1, Anion Gap 16.9   - IVF hydration   -CT abdomen: 1.Findings consistent with significant acute uncomplicated pancreatitis. No evidence of necrosis or focal drainable collection. No evidence of pancreatic ductal dilatation.  - denies daily ETOH use. States had consumed alcohol for his B-day 2 days ago and pain started acutely today  - Lipase 1,213  - analgesics prn  - antiemetics prn  - GI consult    Elevated liver enzymes  - CT abdomen: Significant hepatic steatosis. No overt features of cirrhosis.. Varicosities are present extending to the stomach likely related to portal hypertension. Mild splenomegaly is present.  - unknown baseline. Unable to view records from outside hospital to compare previous admission for same  -   - ALT 66  - bilirubin 1.7  - NPO   - GI to follow     Electrolyte imbalance  Hypokalemia  Hypomagnesemia  - replace per protocol      HTN, acute   - likely 2/2 to pain  - will monitor     Tobacco dependency  - nicotine patch       Nutrition:   NPO Diet NPO Type: Sips with Meds, Ice Chips     VTE Prophylaxis:  Pharmacologic VTE prophylaxis orders are present.         History of Present illness     Juvenal Vance is a 34 y.o. male with no significant PMH  presented to the hospital on 24, and was admitted with a principal diagnosis of Pancreatitis, acute.     Reports with hospitalized 3 weeks ago at [facility] for acute pancreatitis.  He reports had discontinued alcohol use until his birthday 2 days ago when he had shared a 5th with some friends.  He states no other alcohol use.  Today had acute onset of mid  epigastric abdominal pain that did not radiate.  Positive for nausea and episode of vomiting x 1.  CT of the abdomen is positive for acute pancreatitis correlates with elevated liver enzymes.  Provide analgesics antiemetics and IV fluid hydration.  GI to consult    Patient was seen and examined on 07/13/24 at 01:19 EDT .    Subjective / Review of systems     Review of Systems   Constitutional:  Positive for appetite change.   Gastrointestinal:  Positive for abdominal pain (Epigastric).        Past Medical/Surgical/Social/Family History & Allergies     History reviewed. No pertinent past medical history.   History reviewed. No pertinent surgical history.   Social History     Socioeconomic History    Marital status: Single   Tobacco Use    Smoking status: Every Day     Current packs/day: 0.50     Average packs/day: 0.5 packs/day for 5.5 years (2.8 ttl pk-yrs)     Types: Cigarettes     Start date: 2019   Substance and Sexual Activity    Drug use: Never      History reviewed. No pertinent family history.   No Known Allergies   Social Determinants of Health     Tobacco Use: High Risk (7/14/2024)    Patient History     Smoking Tobacco Use: Every Day     Smokeless Tobacco Use: Unknown     Passive Exposure: Not on file   Alcohol Use: Not At Risk (7/14/2024)    AUDIT-C     Frequency of Alcohol Consumption: Monthly or less     Average Number of Drinks: 1 or 2     Frequency of Binge Drinking: Less than monthly   Financial Resource Strain: Not on file   Food Insecurity: Not on file   Transportation Needs: Not on file   Physical Activity: Not on file   Stress: Not on file   Social Connections: Unknown (10/12/2023)    Family and Community Support     Help with Day-to-Day Activities: Not on file     Lonely or Isolated: Not on file   Interpersonal Safety: Not At Risk (7/13/2024)    Abuse Screen     Unsafe at Home or Work/School: no     Feels Threatened by Someone?: no     Does Anyone Keep You from Contacting Others or Doint Things  Outside the Home?: no     Physical Sign of Abuse Present: no   Depression: Not on file   Housing Stability: Unknown (10/12/2023)    Housing Stability     Current Living Arrangements: Not on file     Potentially Unsafe Housing Conditions: Not on file   Utilities: Not on file   Health Literacy: Unknown (10/12/2023)    Education     Help with school or training?: Not on file     Preferred Language: Not on file   Employment: Unknown (10/12/2023)    Employment     Do you want help finding or keeping work or a job?: Not on file   Disabilities: Not At Risk (7/14/2024)    Disabilities     Concentrating, Remembering, or Making Decisions Difficulty: no     Doing Errands Independently Difficulty: no        Home Medications     Prior to Admission medications    Not on File        Objective / Physical Exam     Vital signs:  Temp: 98.9 °F (37.2 °C)  BP: 159/98  Heart Rate: 104  Resp: 20  SpO2: 98 %  Weight: 95.3 kg (210 lb)    Admission Weight: Weight: 95.3 kg (210 lb)    Physical Exam  Constitutional:       Appearance: He is ill-appearing.   Eyes:      Pupils: Pupils are equal, round, and reactive to light.   Cardiovascular:      Rate and Rhythm: Regular rhythm. Tachycardia present.   Pulmonary:      Effort: Pulmonary effort is normal.      Breath sounds: Normal breath sounds.   Abdominal:      Tenderness: There is abdominal tenderness.   Musculoskeletal:         General: Normal range of motion.   Neurological:      Mental Status: He is alert and oriented to person, place, and time.   Psychiatric:         Behavior: Behavior normal.            Labs     Results from last 7 days   Lab Units 07/13/24  1829   WBC 10*3/mm3 8.21   HEMOGLOBIN g/dL 16.9   HEMATOCRIT % 45.8   PLATELETS 10*3/mm3 135*      Results from last 7 days   Lab Units 07/13/24  1829   ALK PHOS U/L 160*   AST (SGOT) U/L 110*   ALT (SGPT) U/L 66*           Results from last 7 days   Lab Units 07/13/24  1829   SODIUM mmol/L 139   POTASSIUM mmol/L 2.9*   CHLORIDE mmol/L  101   CO2 mmol/L 21.1*   BUN mg/dL 7   CREATININE mg/dL 0.82   GLUCOSE mg/dL 146*        Imaging     CT Abdomen Pelvis With Contrast    Result Date: 7/13/2024  CT ABDOMEN PELVIS W CONTRAST Date of Exam: 7/13/2024 9:58 PM EDT Indication: pain. Comparison: None available. Technique: Axial CT images were obtained of the abdomen and pelvis following the uneventful intravenous administration of iodinated contrast. Sagittal and coronal reconstructions were performed.  Automated exposure control and iterative reconstruction methods were used. Findings: Lung Bases:   The visualized lung bases and lower mediastinal structures are unremarkable. Liver: The liver appears significantly hypodense.. No focal lesions. Varicosities extending to the stomach are likely present. No definite surface nodularity identified. Biliary/Gallbladder:  The gallbladder is normal without evidence of radiopaque stones. The biliary tree is nondilated. Spleen: The spleen appears mildly enlarged measuring up to 15 cm.. Pancreas:  Significant inflammatory changes are seen surrounding the pancreatic body head and neck. Diffuse free fluid is present. There is encasement of the second and third portion of the duodenum which demonstrates wall thickening which is likely reactive. No focal drainable collection identified. No evidence of air. No obvious mass identified. No evidence of pancreatic ductal dilatation. Kidneys:  Kidneys are normal in size. There are no stones or hydronephrosis. Adrenals:  Adrenal glands are unremarkable. Retroperitoneal/Lymph Nodes/Vasculature:  No retroperitoneal adenopathy is identified. Gastrointestinal/Mesentery:  The bowel loops are non-dilated without wall thickening or mass. The appendix appears within normal limits. No evidence of obstruction. No free air. No mesenteric fluid collections identified. No significant stool burden. No evidence of hernia. Bladder:  The bladder is normal. Genital:   Unremarkable       Bony  Structures:   Visualized bony structures are consistent with the patient's age.     Impression: 1.Findings consistent with significant acute uncomplicated pancreatitis. No evidence of necrosis or focal drainable collection. No evidence of pancreatic ductal dilatation. 2.Significant hepatic steatosis. No overt features of cirrhosis.. Varicosities are present extending to the stomach likely related to portal hypertension. Mild splenomegaly is present. 3.Ancillary findings as described above. Electronically Signed: Olamide Levine MD  7/13/2024 10:18 PM EDT  Workstation ID: [id number]      No orders to display        Current Medications     Scheduled Meds:  enoxaparin, 40 mg, Subcutaneous, Daily  magnesium sulfate, 2 g, Intravenous, Q2H  nicotine, 1 patch, Transdermal, Nightly  potassium chloride ER, 40 mEq, Oral, Q4H  sodium chloride, 10 mL, Intravenous, Q12H         Continuous Infusions:  sodium chloride, 100 mL/hr           [facility]shine[facility]ish [facility]  07/14/24   01:19 EDT       Electronically signed by Llanes Alvarez, Carlos, MD at 07/14/24 0514          Emergency Department Notes        Edwin Butler RN at 07/13/24 2115          Pt requested more pain medication, provider notified.     Electronically signed by Edwin Butelr RN at 07/13/24 2150       Thanh Brown MD at 07/13/24 1827          Subjective   History of Present Illness  34-year-old male with a reported history of recurrent pancreatitis states he thinks he is having another flareup today.  States it occurred about 2 hours ago and is having some epigastric discomfort associated with nausea and vomiting.  He denies diarrhea or melena or hematochezia or hematemesis.  He reports no fevers or chills or unusual ingestions.  He states he did drink alcohol 2 days ago due to it being his birthday but otherwise has been trying to abstain from alcohol.  Review of Systems    No past medical history on file.  Recurrent pancreatitis  No Known  "Allergies    No past surgical history on file.    No family history on file.    Social History     Socioeconomic History    Marital status: Single     Prior to Admission medications    Not on File     BP (!) 148/106   Pulse 103   Temp 98.7 °F (37.1 °C)   Resp 18   Ht 170.2 cm (67\")   Wt 95.3 kg (210 lb)   SpO2 98%   BMI 32.89 kg/m²         Objective   Physical Exam  General: Well-developed well-appearing, no acute distress, alert and appropriate  Eyes: sclera nonicteric  HEENT: Mucous membranes somewhat dry, no mucosal swelling  Neck: Supple, no nuchal rigidity no JVD  Respirations: Respirations nonlabored, equal breath sounds bilaterally, clear lungs  Heart regular rate and rhythm, no murmurs rubs or gallops,   Abdomen soft, mildly tender palpation epigastrium, no rebound or guarding, nondistended, no hepatosplenomegaly, no hernia, no mass, normal bowel sounds, no CVA tenderness  Extremities no clubbing cyanosis or edema, calves are symmetric and nontender  Neuro cranial nerves grossly intact, no focal limb deficits  Psych oriented, pleasant affect  Skin no rash, brisk cap refill  Procedures          ED Course      Results for orders placed or performed during the hospital encounter of 07/13/24   Comprehensive Metabolic Panel    Specimen: Blood   Result Value Ref Range    Glucose 146 (H) 65 - 99 mg/dL    BUN 7 6 - 20 mg/dL    Creatinine 0.82 0.76 - 1.27 mg/dL    Sodium 139 136 - 145 mmol/L    Potassium 2.9 (L) 3.5 - 5.2 mmol/L    Chloride 101 98 - 107 mmol/L    CO2 21.1 (L) 22.0 - 29.0 mmol/L    Calcium 9.8 8.6 - 10.5 mg/dL    Total Protein 7.2 6.0 - 8.5 g/dL    Albumin 4.3 3.5 - 5.2 g/dL    ALT (SGPT) 66 (H) 1 - 41 U/L    AST (SGOT) 110 (H) 1 - 40 U/L    Alkaline Phosphatase 160 (H) 39 - 117 U/L    Total Bilirubin 1.7 (H) 0.0 - 1.2 mg/dL    Globulin 2.9 gm/dL    A/G Ratio 1.5 g/dL    BUN/Creatinine Ratio 8.5 7.0 - 25.0    Anion Gap 16.9 (H) 5.0 - 15.0 mmol/L    eGFR 119.7 >60.0 mL/min/1.73   Lipase    " Specimen: Blood   Result Value Ref Range    Lipase 1,213 (H) 13 - 60 U/L   Urinalysis With Microscopic If Indicated (No Culture) - Urine, Clean Catch    Specimen: Urine, Clean Catch   Result Value Ref Range    Color, UA [place] (A) Yellow, Straw    Appearance, UA Clear Clear    pH, UA 6.0 5.0 - 8.0    Specific Gravity, UA 1.033 (H) 1.005 - 1.030    Glucose, UA Negative Negative    Ketones, UA Trace (A) Negative    Bilirubin, UA Small (1+) (A) Negative    Blood, UA Negative Negative    Protein,  mg/dL (2+) (A) Negative    Leuk Esterase, UA Small (1+) (A) Negative    Nitrite, UA Positive (A) Negative    Urobilinogen, UA 1.0 E.U./dL 0.2 - 1.0 E.U./dL   Ethanol    Specimen: Blood   Result Value Ref Range    Ethanol % <0.010 %   CBC Auto Differential    Specimen: Blood   Result Value Ref Range    WBC 8.21 3.40 - 10.80 10*3/mm3    RBC 4.92 4.14 - 5.80 10*6/mm3    Hemoglobin 16.9 13.0 - 17.7 g/dL    Hematocrit 45.8 37.5 - 51.0 %    MCV 93.1 79.0 - 97.0 fL    MCH 34.3 (H) 26.6 - 33.0 pg    MCHC 36.9 (H) 31.5 - 35.7 g/dL    RDW 15.2 12.3 - 15.4 %    RDW-SD 52.1 37.0 - 54.0 fl    MPV 9.6 6.0 - 12.0 fL    Platelets 135 (L) 140 - 450 10*3/mm3    Neutrophil % 82.1 (H) 42.7 - 76.0 %    Lymphocyte % 12.2 (L) 19.6 - 45.3 %    Monocyte % 4.8 (L) 5.0 - 12.0 %    Eosinophil % 0.1 (L) 0.3 - 6.2 %    Basophil % 0.4 0.0 - 1.5 %    Immature Grans % 0.4 0.0 - 0.5 %    Neutrophils, Absolute 6.75 1.70 - 7.00 10*3/mm3    Lymphocytes, Absolute 1.00 0.70 - 3.10 10*3/mm3    Monocytes, Absolute 0.39 0.10 - 0.90 10*3/mm3    Eosinophils, Absolute 0.01 0.00 - 0.40 10*3/mm3    Basophils, Absolute 0.03 0.00 - 0.20 10*3/mm3    Immature Grans, Absolute 0.03 0.00 - 0.05 10*3/mm3    nRBC 0.0 0.0 - 0.2 /100 WBC   Magnesium    Specimen: Blood   Result Value Ref Range    Magnesium 1.4 (L) 1.6 - 2.6 mg/dL   Urinalysis, Microscopic Only - Urine, Clean Catch    Specimen: Urine, Clean Catch   Result Value Ref Range    RBC, UA 0-2 None Seen, 0-2 /HPF    WBC,  UA 0-2 None Seen, 0-2 /HPF    Bacteria, UA None Seen None Seen /HPF    Squamous Epithelial Cells, UA 0-2 None Seen, 0-2 /HPF    Hyaline Casts, UA 3-6 None Seen /LPF    Methodology Automated Microscopy      CT Abdomen Pelvis With Contrast    Result Date: 7/13/2024  Impression: 1.Findings consistent with significant acute uncomplicated pancreatitis. No evidence of necrosis or focal drainable collection. No evidence of pancreatic ductal dilatation. 2.Significant hepatic steatosis. No overt features of cirrhosis.. Varicosities are present extending to the stomach likely related to portal hypertension. Mild splenomegaly is present. 3.Ancillary findings as described above. Electronically Signed: Olamide Levine MD  7/13/2024 10:18 PM EDT  Workstation ID: [id number]                                          Medical Decision Making  Differential diagnosis including pancreatitis, cholecystitis, peptic ulcer disease, ischemic bowel, bowel obstruction    Patient was ordered Dilaudid and Zofran for acute pain management.  He was ordered IV fluids.  He had some marginal improvement and was advised of findings.  He was ordered potassium and magnesium replacement.  Lab and CT findings consistent with acute pancreatitis likely related to his recent alcohol consumption.  Patient is agreeable to plan of admission for further care.  Case discussed with Sunshine with the hospitalist service who is agreeable plan of admission.            Problems Addressed:  Alcohol-induced acute pancreatitis, unspecified complication status: complicated acute illness or injury  Hypokalemia: complicated acute illness or injury    Amount and/or Complexity of Data Reviewed  Labs: ordered. Decision-making details documented in ED Course.     Details: Lipase elevated, if enzymes elevated likely related to his alcohol consumption, magnesium and potassium are low, urinalysis negative for bacteria, CBC shows no leukocytosis, there is mild thrombocytopenia  Radiology:  ordered and independent interpretation performed.     Details: My independent interpretation of CT abdomen image peripancreatic inflammatory process, no bowel obstruction    Risk  OTC drugs.  Prescription drug management.  Decision regarding hospitalization.        Final diagnoses:   Alcohol-induced acute pancreatitis, unspecified complication status   Hypokalemia       ED Disposition  ED Disposition       ED Disposition   Decision to Admit    Condition   --    Comment   Level of Care: Telemetry [5]   Admitting Physician: LLANES ALVAREZ, CARLOS [[id number]]   Attending Physician: LLANES ALVAREZ, CARLOS [[id number]]                 No follow-up provider specified.       Medication List      No changes were made to your prescriptions during this visit.            Thanh Brown MD  07/13/24 2258       Thanh Brown MD  07/13/24 2258      Electronically signed by Thanh Brown MD at 07/13/24 2258       Vital Signs (last day)       Date/Time Temp Temp src Pulse Resp BP Patient Position SpO2    07/15/24 1217 98.4 (36.9) Oral 97 18 133/84 Lying 99    07/15/24 0748 98.6 (37) Oral 94 18 143/98 Sitting 98    07/15/24 0400 98.7 (37.1) Oral 86 21 132/92 Lying 97    07/15/24 0000 98.1 (36.7) Oral 92 20 -- -- --    07/14/24 2000 98 (36.7) Oral 108 18 149/95 Lying 97    07/14/24 1613 -- -- 89 18 128/84 -- 99    07/14/24 1034 97.5 (36.4) -- 84 16 154/100 -- 100    07/14/24 0800 -- -- -- 16 141/105 Sitting --    07/14/24 0756 97.9 (36.6) Oral -- 14 -- Sitting --    07/14/24 0321 98.3 (36.8) Oral 108 22 155/98 Lying 99    07/14/24 0000 98.9 (37.2) Oral 104 20 159/98 Lying 98          Facility-Administered Medications as of 7/15/2024   Medication Dose Route Frequency Provider Last Rate Last Admin    sennosides-docusate (PERICOLACE) 8.6-50 MG per tablet 2 tablet  2 tablet Oral BID PRN Llanes Alvarez, Carlos, MD        And    polyethylene glycol (MIRALAX) packet 17 g  17 g Oral Daily PRN Llanes Alvarez, Carlos, MD        And     bisacodyl (DULCOLAX) EC tablet 5 mg  5 mg Oral Daily PRN Llanes Alvarez, Carlos, MD        And    bisacodyl (DULCOLAX) suppository 10 mg  10 mg Rectal Daily PRN Llanes Alvarez, Carlos, MD        cefTRIAXone (ROCEPHIN) 2,000 mg in sodium chloride 0.9 % 100 mL MBP  2,000 mg Intravenous Q24H Greg Thompson  mL/hr at 07/15/24 0921 2,000 mg at 07/15/24 0921    [Held by provider] Enoxaparin Sodium (LOVENOX) syringe 40 mg  40 mg Subcutaneous Daily Llanes Alvarez, Carlos, MD   40 mg at 07/14/24 0125    [COMPLETED] famotidine (PEPCID) injection 20 mg  20 mg Intravenous Once Thanh Brown MD   20 mg at 07/13/24 1833    HYDROmorphone (DILAUDID) injection 0.5 mg  0.5 mg Intravenous Q2H PRN Llanes Alvarez, Carlos, MD   0.5 mg at 07/15/24 0704    [COMPLETED] HYDROmorphone (DILAUDID) injection 1 mg  1 mg Intravenous Once Thanh Brown MD   1 mg at 07/13/24 1833    [COMPLETED] HYDROmorphone (DILAUDID) injection 1 mg  1 mg Intravenous Once Thanh Brown MD   1 mg at 07/13/24 2325    [COMPLETED] iopamidol (ISOVUE-370) 76 % injection 100 mL  100 mL Intravenous Once in imaging Thanh Brown MD   100 mL at 07/13/24 2212    [COMPLETED] lactated ringers bolus 1,000 mL  1,000 mL Intravenous Once Thanh Brown MD   Stopped at 07/13/24 2043    Magnesium Standard Dose Replacement - Follow Nurse / BPA Driven Protocol   Does not apply PRN Thanh Brown MD        [COMPLETED] magnesium sulfate 2g/50 mL (PREMIX) infusion  2 g Intravenous Q2H Llanes Alvarez, Carlos, MD   Stopped at 07/14/24 0749    [COMPLETED] morphine injection 4 mg  4 mg Intravenous Once Thanh Brown MD   4 mg at 07/13/24 2042    nicotine (NICODERM CQ) 21 MG/24HR patch 1 patch  1 patch Transdermal Nightly Llanes Alvarez, Carlos, MD   1 patch at 07/14/24 2031    nitroglycerin (NITROSTAT) SL tablet 0.4 mg  0.4 mg Sublingual Q5 Min PRN Llanes Alvarez, Carlos, MD        [COMPLETED] ondansetron (ZOFRAN) injection 4 mg  4 mg Intravenous Once  Thanh Brown MD   4 mg at 07/13/24 1833    ondansetron (ZOFRAN) injection 4 mg  4 mg Intravenous Q6H PRN Llanes Alvarez, Carlos, MD   4 mg at 07/14/24 0755    pantoprazole (PROTONIX) EC tablet 40 mg  40 mg Oral BID Chanell Kay APRN        [COMPLETED] potassium chloride (KLOR-CON M20) CR tablet 40 mEq  40 mEq Oral Q4H Thanh Brown MD   40 mEq at 07/14/24 0531    Potassium Replacement - Follow Nurse / BPA Driven Protocol   Does not apply PRN Thanh Brown MD        sodium chloride 0.9 % flush 10 mL  10 mL Intravenous PRN Thanh Brown MD        sodium chloride 0.9 % flush 10 mL  10 mL Intravenous Q12H Llanes Alvarez, Carlos, MD   10 mL at 07/14/24 0758    sodium chloride 0.9 % flush 10 mL  10 mL Intravenous PRN Llanes Alvarez, Carlos, MD        sodium chloride 0.9 % infusion 40 mL  40 mL Intravenous PRN Llanes Alvarez, Carlos, MD         Lab Results (last 24 hours)       Procedure Component Value Units Date/Time    Lipase [[id number]]  (Abnormal) Collected: 07/15/24 0751    Specimen: Blood Updated: 07/15/24 0832     Lipase 475 U/L     Magnesium [[id number]]  (Normal) Collected: 07/15/24 0751    Specimen: Blood Updated: 07/15/24 0824     Magnesium 2.0 mg/dL     Comprehensive Metabolic Panel [[id number]]  (Abnormal) Collected: 07/15/24 0751    Specimen: Blood Updated: 07/15/24 0820     Glucose 101 mg/dL      BUN 8 mg/dL      Creatinine 0.68 mg/dL      Sodium 134 mmol/L      Potassium 4.3 mmol/L      Comment: Slight hemolysis detected by analyzer. Result may be falsely elevated.        Chloride 102 mmol/L      CO2 23.9 mmol/L      Calcium 8.4 mg/dL      Total Protein 5.9 g/dL      Albumin 3.5 g/dL      ALT (SGPT) 38 U/L      AST (SGOT) 61 U/L      Alkaline Phosphatase 121 U/L      Total Bilirubin 2.8 mg/dL      Globulin 2.4 gm/dL      A/G Ratio 1.5 g/dL      BUN/Creatinine Ratio 11.8     Anion Gap 8.1 mmol/L      eGFR 126.7 mL/min/1.73     Narrative:      GFR Normal >60  Chronic Kidney Disease  <60  Kidney Failure <15      C-reactive Protein [[id number]]  (Abnormal) Collected: 07/15/24 0507    Specimen: Blood Updated: 07/15/24 0541     C-Reactive Protein 10.20 mg/dL     Sedimentation Rate [[id number]]  (Normal) Collected: 07/15/24 0507    Specimen: Blood Updated: 07/15/24 0525     Sed Rate 5 mm/hr     CBC & Differential [[id number]]  (Abnormal) Collected: 07/15/24 0507    Specimen: Blood Updated: 07/15/24 0522    Narrative:      The following orders were created for panel order CBC & Differential.  Procedure                               Abnormality         Status                     ---------                               -----------         ------                     CBC Auto Differential[[id number]]        Abnormal            Final result                 Please view results for these tests on the individual orders.    CBC Auto Differential [[id number]]  (Abnormal) Collected: 07/15/24 0507    Specimen: Blood Updated: 07/15/24 0522     WBC 3.17 10*3/mm3      RBC 3.68 10*6/mm3      Hemoglobin 12.5 g/dL      Hematocrit 36.5 %      MCV 99.2 fL      MCH 34.0 pg      MCHC 34.2 g/dL      RDW 15.3 %      RDW-SD 55.2 fl      MPV 10.5 fL      Platelets 71 10*3/mm3      Neutrophil % 61.5 %      Lymphocyte % 29.7 %      Monocyte % 7.3 %      Eosinophil % 0.9 %      Basophil % 0.3 %      Immature Grans % 0.3 %      Neutrophils, Absolute 1.95 10*3/mm3      Lymphocytes, Absolute 0.94 10*3/mm3      Monocytes, Absolute 0.23 10*3/mm3      Eosinophils, Absolute 0.03 10*3/mm3      Basophils, Absolute 0.01 10*3/mm3      Immature Grans, Absolute 0.01 10*3/mm3      nRBC 0.0 /100 WBC           Imaging Results (All)       Procedure Component Value Units Date/Time    CT Abdomen Pelvis With Contrast [[id number]] Collected: 07/13/24 2216     Updated: 07/13/24 2221    Narrative:      CT ABDOMEN PELVIS W CONTRAST    Date of Exam: 7/13/2024 9:58 PM EDT    Indication: pain.    Comparison: None available.    Technique: Axial CT images were  obtained of the abdomen and pelvis following the uneventful intravenous administration of iodinated contrast. Sagittal and coronal reconstructions were performed.  Automated exposure control and iterative reconstruction   methods were used.        Findings:  Lung Bases:     The visualized lung bases and lower mediastinal structures are unremarkable.    Liver:  The liver appears significantly hypodense.. No focal lesions. Varicosities extending to the stomach are likely present. No definite surface nodularity identified.    Biliary/Gallbladder:    The gallbladder is normal without evidence of radiopaque stones. The biliary tree is nondilated.    Spleen:  The spleen appears mildly enlarged measuring up to 15 cm..    Pancreas:    Significant inflammatory changes are seen surrounding the pancreatic body head and neck. Diffuse free fluid is present. There is encasement of the second and third portion of the duodenum which demonstrates wall thickening which is likely reactive. No   focal drainable collection identified. No evidence of air. No obvious mass identified. No evidence of pancreatic ductal dilatation.    Kidneys:    Kidneys are normal in size. There are no stones or hydronephrosis.    Adrenals:    Adrenal glands are unremarkable.    Retroperitoneal/Lymph Nodes/Vasculature:    No retroperitoneal adenopathy is identified.    Gastrointestinal/Mesentery:    The bowel loops are non-dilated without wall thickening or mass. The appendix appears within normal limits. No evidence of obstruction. No free air. No mesenteric fluid collections identified. No significant stool burden. No evidence of hernia.    Bladder:    The bladder is normal.    Genital:     Unremarkable          Bony Structures:     Visualized bony structures are consistent with the patient's age.        Impression:      Impression:  1.Findings consistent with significant acute uncomplicated pancreatitis. No evidence of necrosis or focal drainable  collection. No evidence of pancreatic ductal dilatation.  2.Significant hepatic steatosis. No overt features of cirrhosis.. Varicosities are present extending to the stomach likely related to portal hypertension. Mild splenomegaly is present.  3.Ancillary findings as described above.        Electronically Signed: Olamide Levine MD    2024 10:18 PM EDT    Workstation ID: [id number]          Operative/Procedure Notes (all)    No notes of this type exist for this encounter.          Physician Progress Notes (last 48 hours)        Chanell Garrett APRN at 07/15/24 [address] [place] [address] MEDICINE SERVICE  DAILY PROGRESS NOTE    NAME: Juvenal Vance  : 1990  MRN: [id number]      LOS: 2 days     PROVIDER OF SERVICE: PAU Hull    Chief Complaint: Pancreatitis, acute    Subjective:     Interval History:  History taken from: patient  Patient Complaints: Mild abdominal pain intermittent  Patient Denies: SOB, CP, dizziness, headache, urinary symptoms    Review of Systems:   Review of Systems   Respiratory:  Negative for shortness of breath.    Cardiovascular:  Negative for chest pain.   Gastrointestinal:  Positive for abdominal pain.   Genitourinary:  Negative for difficulty urinating, dysuria, flank pain, frequency and urgency.   Neurological:  Negative for dizziness and headaches.       Objective:     Vital Signs  Temp:  [98 °F (36.7 °C)-98.7 °F (37.1 °C)] 98.4 °F (36.9 °C)  Heart Rate:  [] 97  Resp:  [18-21] 18  BP: (128-149)/(84-98) 133/84   Body mass index is 32.1 kg/m².    Physical Exam  Physical Exam  Constitutional:       Appearance: He is obese.   HENT:      Head: Normocephalic and atraumatic.      Nose: Nose normal.      Mouth/Throat:      Mouth: Mucous membranes are moist.      Pharynx: Oropharynx is clear.   Eyes:      Extraocular Movements: Extraocular movements intact.      Conjunctiva/sclera: Conjunctivae normal.      Pupils: Pupils are equal, round, and reactive to  light.   Cardiovascular:      Rate and Rhythm: Normal rate and regular rhythm.      Pulses: Normal pulses.      Heart sounds: Normal heart sounds.   Pulmonary:      Effort: Pulmonary effort is normal.      Breath sounds: Normal breath sounds.   Abdominal:      General: Abdomen is flat. Bowel sounds are normal.      Palpations: Abdomen is soft.      Tenderness: There is no abdominal tenderness.   Musculoskeletal:         General: Normal range of motion.      Cervical back: Normal range of motion and neck supple.   Skin:     General: Skin is warm and dry.   Neurological:      General: No focal deficit present.      Mental Status: He is alert and oriented to person, place, and time. Mental status is at baseline.   Psychiatric:         Mood and Affect: Mood normal.         Behavior: Behavior normal.         Thought Content: Thought content normal.         Judgment: Judgment normal.         Scheduled Meds   cefTRIAXone, 2,000 mg, Intravenous, Q24H  [Held by provider] enoxaparin, 40 mg, Subcutaneous, Daily  nicotine, 1 patch, Transdermal, Nightly  sodium chloride, 10 mL, Intravenous, Q12H       PRN Meds     senna-docusate sodium **AND** polyethylene glycol **AND** bisacodyl **AND** bisacodyl    HYDROmorphone    Magnesium Standard Dose Replacement - Follow Nurse / BPA Driven Protocol    nitroglycerin    ondansetron    Potassium Replacement - Follow Nurse / BPA Driven Protocol    [COMPLETED] Insert Peripheral IV **AND** sodium chloride    sodium chloride    sodium chloride   Infusions         Diagnostic Data    Results from last 7 days   Lab Units 07/15/24  0751 07/15/24  0507   WBC 10*3/mm3  --  3.17*   HEMOGLOBIN g/dL  --  12.5*   HEMATOCRIT %  --  36.5*   PLATELETS 10*3/mm3  --  71*   GLUCOSE mg/dL 101*  --    CREATININE mg/dL 0.68*  --    BUN mg/dL 8  --    SODIUM mmol/L 134*  --    POTASSIUM mmol/L 4.3  --    AST (SGOT) U/L 61*  --    ALT (SGPT) U/L 38  --    ALK PHOS U/L 121*  --    BILIRUBIN mg/dL 2.8*  --    ANION  "GAP mmol/L 8.1  --        CT Abdomen Pelvis With Contrast    Result Date: 7/13/2024  Impression: 1.Findings consistent with significant acute uncomplicated pancreatitis. No evidence of necrosis or focal drainable collection. No evidence of pancreatic ductal dilatation. 2.Significant hepatic steatosis. No overt features of cirrhosis.. Varicosities are present extending to the stomach likely related to portal hypertension. Mild splenomegaly is present. 3.Ancillary findings as described above. Electronically Signed: Olamide Levine MD  7/13/2024 10:18 PM EDT  Workstation ID: [id number]       I reviewed the patient's new clinical results.    Assessment/Plan:     Active and Resolved Problems  Active Hospital Problems    Diagnosis  POA    **Pancreatitis, acute [K85.90]  Yes      Resolved Hospital Problems   No resolved problems to display.       Abdominal pain  2/2 to recurrent acute pancreatitis  Alcohol induced  Metabolic acidosis  - Co2 21.1, Anion Gap 16.9 on arrival  -CT abdomen: 1.Findings consistent with significant acute uncomplicated pancreatitis. No evidence of necrosis or focal drainable collection. No evidence of pancreatic ductal dilatation.  -Patient reports he used to be a heavy drinker, stating he would drink a pint every 2 days.  Patient reports since November he has \"significantly cut down\", states he had consumed alcohol for his birthday and pain started acutely today  - Lipase 1,213, today 475  - analgesics prn  - antiemetics prn  -Will trial clear liquid diet, discontinue IV fluids at this time  -If patient is unable to tolerate clear liquid diet then will make n.p.o. and restart IV fluids  -PPI BID  -Started IV rocephin for IAI and poss UTI, UA positive for nitrites, patient denies urinary symptoms at this time. Will repeat UA  -Discontinue lovenox due to drop in hemoglobin from 15.8 to 12.5, no known source of bleeding at this time.  A.m. labs ordered to monitor      Elevated liver enzymes-improving  - " CT abdomen: Significant hepatic steatosis. No overt features of cirrhosis.. Varicosities are present extending to the stomach likely related to portal hypertension. Mild splenomegaly is present.  - unknown baseline. Unable to view records from outside hospital to compare previous admission for same  - AST 61  - ALT 38  - bilirubin 2.8  -Clear liquid diet     Electrolyte imbalance  Hypokalemia-resolved  Hypomagnesemia-resolved  - replace per protocol    -A.m. labs ordered     HTN, acute-improved  - likely 2/2 to pain  - will monitor      Tobacco dependency  - nicotine patch         Nutrition:   Clear liquid diet    VTE Prophylaxis:  Pharmacologic VTE prophylaxis orders are present.         Code status is   Code Status and Medical Interventions:   Ordered at: 24 2305     Level Of Support Discussed With:    Patient     Code Status (Patient has no pulse and is not breathing):    CPR (Attempt to Resuscitate)     Medical Interventions (Patient has pulse or is breathing):    Full Support       Plan for disposition: Pending toleration of clear liquid diet, will transition patient to regular diet tomorrow and possible discharge tomorrow night versus 2024    Time: 30 minutes    Signature: Electronically signed by PAU Hull, 07/15/24, 12:48 EDT.  [facility] Hospitalist Team     Electronically signed by Chanell Garrett APRN at 07/15/24 1257       Otilia Butler MD at 24 1152              [facility] MEDICINE SERVICE  DAILY PROGRESS NOTE    NAME: Juvenal Vance  : 1990  MRN: [id number]      LOS: 1 day     PROVIDER OF SERVICE: Otilia Butler MD    Chief Complaint: Pancreatitis, acute    History of Present Illness:     Subjective:     Interval History:      Review of Systems:   Negative except what is listed above     Objective:     Vital Signs  Temp:  [97.5 °F (36.4 °C)-98.9 °F (37.2 °C)] 97.5 °F (36.4 °C)  Heart Rate:  [] 84  Resp:  [14-22] 16  BP:  (118-159)/() 154/100   Body mass index is 32.1 kg/m².    Physical Exam    General Appearance:    Alert, cooperative, in no acute distress   Head:    Normocephalic, without obvious abnormality, atraumatic   Eyes:            conjunctivae and sclerae normal, no   icterus, no pallor, corneas  clear, PERRLA   Neck:   No adenopathy, supple, trachea midline, no thyromegaly, no   carotid bruit, no JVD   Lungs:     Clear to auscultation,respirations regular, even and                  unlabored    Heart:    Regular rhythm and normal rate, normal S1 and S2, no            murmur, no gallop, no rub, no click   Abdomen:     Normal bowel sounds, no masses, no organomegaly, soft        non-tender, non-distended, no guarding, no rebound                No tenderness   Extremities:   Moves all extremities well, no edema, no cyanosis, no             redness   Lymph nodes:   No palpable adenopathy   Neurologic:   Cranial nerves 2 - 12 grossly intact, sensation intact, DTR       present and equal bilaterally       Scheduled Meds   enoxaparin, 40 mg, Subcutaneous, Daily  nicotine, 1 patch, Transdermal, Nightly  sodium chloride, 10 mL, Intravenous, Q12H       PRN Meds     senna-docusate sodium **AND** polyethylene glycol **AND** bisacodyl **AND** bisacodyl    HYDROmorphone    Magnesium Standard Dose Replacement - Follow Nurse / BPA Driven Protocol    nitroglycerin    ondansetron    Potassium Replacement - Follow Nurse / BPA Driven Protocol    [COMPLETED] Insert Peripheral IV **AND** sodium chloride    sodium chloride    sodium chloride   Infusions  sodium chloride, 100 mL/hr, Last Rate: 100 mL/hr (07/14/24 0749)          Diagnostic Data    Results from last 7 days   Lab Units 07/14/24  0538   WBC 10*3/mm3 6.04   HEMOGLOBIN g/dL 15.8   HEMATOCRIT % 45.0   PLATELETS 10*3/mm3 107*   GLUCOSE mg/dL 153*   CREATININE mg/dL 0.66*   BUN mg/dL 8   SODIUM mmol/L 133*   POTASSIUM mmol/L 4.0   AST (SGOT) U/L 62*   ALT (SGPT) U/L 48*   ALK PHOS  U/L 131*   BILIRUBIN mg/dL 2.3*   ANION GAP mmol/L 11.6       CT Abdomen Pelvis With Contrast    Result Date: 7/13/2024  Impression: 1.Findings consistent with significant acute uncomplicated pancreatitis. No evidence of necrosis or focal drainable collection. No evidence of pancreatic ductal dilatation. 2.Significant hepatic steatosis. No overt features of cirrhosis.. Varicosities are present extending to the stomach likely related to portal hypertension. Mild splenomegaly is present. 3.Ancillary findings as described above. Electronically Signed: Olamide Levine MD  7/13/2024 10:18 PM EDT  Workstation ID: [id number]       I have personally reviewed the patient's new results.     Assessment/Plan:     Active and Resolved Problems    Acute alcohol induced pancreatitis  History of binge drinking  Transaminitis  Hypokalemia  Hypertension  Morbid obesity  Smoker    Suggestion:    7/14-at this time I will continue current supportive care.  Continue IV hydration and pain management.  Counseled about smoking cessation.  Counseled about alcohol abuse    VTE Prophylaxis:  Pharmacologic VTE prophylaxis orders are present.         Code status is   Code Status and Medical Interventions:   Ordered at: 07/13/24 9483     Level Of Support Discussed With:    Patient     Code Status (Patient has no pulse and is not breathing):    CPR (Attempt to Resuscitate)     Medical Interventions (Patient has pulse or is breathing):    Full Support       Plan for disposition: 7/16    Time: 30 minutes    Signature: Electronically signed by Otilia Butler MD, 07/14/24, 11:52 EDT.  [facility] Hospitalist Team    Electronically signed by Otilia Butler MD at 07/14/24 0251

## 2024-07-15 NOTE — PLAN OF CARE
Goal Outcome Evaluation:  Plan of Care Reviewed With: patient        Progress: improving  Outcome Evaluation: Patient reports feeling better today. Pain controlled with PO pain medication. Patient tolerated clear liquid diet with no complaints, upgraded to full liquid. IVF discontinued. Patient received IV rocephin for possible UTI, repeat urine negative. Patient becomes hypertensive when in pain and has resolved. Patient likely to discharge home tomorrow.

## 2024-07-15 NOTE — CASE MANAGEMENT/SOCIAL WORK
Discharge Planning Assessment   [facility]     Patient Name: Juvenal Vance  MRN: [id number]  Today's Date: 7/15/2024    Admit Date: 7/13/2024    Plan: ELVIS Plan: Anticipate routine home with significant other.   Discharge Needs Assessment       Row Name 07/15/24 1203       Living Environment    People in Home significant other    Name(s) of People in Home Chuckie Lavis    Current Living Arrangements home    Potentially Unsafe Housing Conditions none    In the past 12 months has the electric, gas, oil, or water company threatened to shut off services in your home? No    Primary Care Provided by self    Provides Primary Care For no one    Family Caregiver if Needed significant other    Family Caregiver Names Chuckie Sherwood    Quality of Family Relationships helpful;involved;supportive    Able to Return to Prior Arrangements yes       Resource/Environmental Concerns    Resource/Environmental Concerns none    Transportation Concerns none       Transportation Needs    In the past 12 months, has lack of transportation kept you from medical appointments or from getting medications? no    In the past 12 months, has lack of transportation kept you from meetings, work, or from getting things needed for daily living? No       Food Insecurity    Within the past 12 months, you worried that your food would run out before you got the money to buy more. Never true    Within the past 12 months, the food you bought just didn't last and you didn't have money to get more. Never true       Transition Planning    Patient/Family Anticipates Transition to home with family    Patient/Family Anticipated Services at Transition none    Transportation Anticipated car, drives self;family or friend will provide       Discharge Needs Assessment    Readmission Within the Last 30 Days no previous admission in last 30 days    Equipment Currently Used at Home none    Concerns to be Addressed discharge planning    Anticipated Changes Related to  Illness none    Equipment Needed After Discharge none    Provided Post Acute Provider List? N/A    Provided Post Acute Provider Quality & Resource List? N/A                   Discharge Plan       Row Name 07/15/24 1204       Plan    Plan DC Plan: Anticipate routine home with significant other.    Patient/Family in Agreement with Plan yes    Provided Post Acute Provider List? N/A    Provided Post Acute Provider Quality & Resource List? N/A    Plan Comments CM spoke with patient at bedside to discuss admission assessment and discharge planning. Patient confirms he does not have a PCP or insurance at this time. Patient will need PCP set up with [facility] at DC. Patient states he is applying for insurance currently. Patient is already enrolled in meds to bed program. Patient denies any difficulty affording medications or food at this time. Patient denies any additional needs for services or DME at this time. Patient reports he is IADL's and drives at baseline. Patients significant other will provide transportation when ready for DC.CM will continue to follow for any further needs and adjust discharge plan accordingly. DC Barriers: Cardiac monitoring, IVF's, IV abx, and monitor labs.                 Expected Discharge Date and Time       Expected Discharge Date Expected Discharge Time    Jul 16, 2024            Demographic Summary       Row Name 07/15/24 1202       General Information    Admission Type inpatient    Arrived From emergency department;home    Referral Source admission list    Reason for Consult discharge planning    Preferred Language English       Contact Information    Permission Granted to Share Info With                    Functional Status       Row Name 07/15/24 1203       Functional Status    Usual Activity Tolerance excellent    Current Activity Tolerance excellent       Physical Activity    On average, how many days per week do you engage in moderate to strenuous exercise (like a  brisk walk)? 0 days    On average, how many minutes do you engage in exercise at this level? 0 min    Number of minutes of exercise per week 0       Functional Status, IADL    Medications independent    Meal Preparation independent    Housekeeping independent    Laundry independent    Shopping independent       Mental Status    General Appearance WDL WDL       Mental Status Summary    Recent Changes in Mental Status/Cognitive Functioning no changes       Employment/    Employment Status employed full-time    Current or Previous Occupation service industry    Employment/ Comments [employer]           Current or Previous  Service none                 Met with patient in room   Maintain distance greater than six feet and spent less than fifteen minutes in the room.      Kasey Rhodes, RN    Office Phone: [phone]  Office Cell:     [phone]

## 2024-07-15 NOTE — CASE MANAGEMENT/SOCIAL WORK
Social Work Assessment  [facility]     Patient Name: Juvenal Vance  MRN: [id number]  Today's Date: 7/15/2024    Admit Date: 7/13/2024         Discharge Plan       Row Name 07/15/24 1640       Plan    Plan Comments LSW met with pt at bedside 2/2 ETOH use and PCP. LSW scheduled PCP follow up 7.17 and added to AVS. Pt declined ETOH resources and reported that he has quit and has a good support system. No further needs identified.        MARLON Canales, MSW    Phone: [phone]  Fax: [phone]  Email: [email or web address]

## 2024-07-15 NOTE — PLAN OF CARE
Goal Outcome Evaluation:      Pt rested well throughout the night. VSS. Medicated x2 for pain, see MAR. Labs hemolyzed and recollected, awaiting results. Denies pain/discomfort at this time.

## 2024-07-15 NOTE — PROGRESS NOTES
[facility] MEDICINE SERVICE  DAILY PROGRESS NOTE    NAME: Juvenal Vance  : 1990  MRN: [id number]      LOS: 2 days     PROVIDER OF SERVICE: PAU Hull    Chief Complaint: Pancreatitis, acute    Subjective:     Interval History:  History taken from: patient  Patient Complaints: Mild abdominal pain intermittent  Patient Denies: SOB, CP, dizziness, headache, urinary symptoms    Review of Systems:   Review of Systems   Respiratory:  Negative for shortness of breath.    Cardiovascular:  Negative for chest pain.   Gastrointestinal:  Positive for abdominal pain.   Genitourinary:  Negative for difficulty urinating, dysuria, flank pain, frequency and urgency.   Neurological:  Negative for dizziness and headaches.       Objective:     Vital Signs  Temp:  [98 °F (36.7 °C)-98.7 °F (37.1 °C)] 98.4 °F (36.9 °C)  Heart Rate:  [] 97  Resp:  [18-21] 18  BP: (128-149)/(84-98) 133/84   Body mass index is 32.1 kg/m².    Physical Exam  Physical Exam  Constitutional:       Appearance: He is obese.   HENT:      Head: Normocephalic and atraumatic.      Nose: Nose normal.      Mouth/Throat:      Mouth: Mucous membranes are moist.      Pharynx: Oropharynx is clear.   Eyes:      Extraocular Movements: Extraocular movements intact.      Conjunctiva/sclera: Conjunctivae normal.      Pupils: Pupils are equal, round, and reactive to light.   Cardiovascular:      Rate and Rhythm: Normal rate and regular rhythm.      Pulses: Normal pulses.      Heart sounds: Normal heart sounds.   Pulmonary:      Effort: Pulmonary effort is normal.      Breath sounds: Normal breath sounds.   Abdominal:      General: Abdomen is flat. Bowel sounds are normal.      Palpations: Abdomen is soft.      Tenderness: There is no abdominal tenderness.   Musculoskeletal:         General: Normal range of motion.      Cervical back: Normal range of motion and neck supple.   Skin:     General: Skin is warm and dry.   Neurological:       General: No focal deficit present.      Mental Status: He is alert and oriented to person, place, and time. Mental status is at baseline.   Psychiatric:         Mood and Affect: Mood normal.         Behavior: Behavior normal.         Thought Content: Thought content normal.         Judgment: Judgment normal.         Scheduled Meds   cefTRIAXone, 2,000 mg, Intravenous, Q24H  [Held by provider] enoxaparin, 40 mg, Subcutaneous, Daily  nicotine, 1 patch, Transdermal, Nightly  sodium chloride, 10 mL, Intravenous, Q12H       PRN Meds     senna-docusate sodium **AND** polyethylene glycol **AND** bisacodyl **AND** bisacodyl    HYDROmorphone    Magnesium Standard Dose Replacement - Follow Nurse / BPA Driven Protocol    nitroglycerin    ondansetron    Potassium Replacement - Follow Nurse / BPA Driven Protocol    [COMPLETED] Insert Peripheral IV **AND** sodium chloride    sodium chloride    sodium chloride   Infusions         Diagnostic Data    Results from last 7 days   Lab Units 07/15/24  0751 07/15/24  0507   WBC 10*3/mm3  --  3.17*   HEMOGLOBIN g/dL  --  12.5*   HEMATOCRIT %  --  36.5*   PLATELETS 10*3/mm3  --  71*   GLUCOSE mg/dL 101*  --    CREATININE mg/dL 0.68*  --    BUN mg/dL 8  --    SODIUM mmol/L 134*  --    POTASSIUM mmol/L 4.3  --    AST (SGOT) U/L 61*  --    ALT (SGPT) U/L 38  --    ALK PHOS U/L 121*  --    BILIRUBIN mg/dL 2.8*  --    ANION GAP mmol/L 8.1  --        CT Abdomen Pelvis With Contrast    Result Date: 7/13/2024  Impression: 1.Findings consistent with significant acute uncomplicated pancreatitis. No evidence of necrosis or focal drainable collection. No evidence of pancreatic ductal dilatation. 2.Significant hepatic steatosis. No overt features of cirrhosis.. Varicosities are present extending to the stomach likely related to portal hypertension. Mild splenomegaly is present. 3.Ancillary findings as described above. Electronically Signed: Olamide Levine MD  7/13/2024 10:18 PM EDT  Workstation ID:  "[id number]       I reviewed the patient's new clinical results.    Assessment/Plan:     Active and Resolved Problems  Active Hospital Problems    Diagnosis  POA    **Pancreatitis, acute [K85.90]  Yes      Resolved Hospital Problems   No resolved problems to display.       Abdominal pain  2/2 to recurrent acute pancreatitis  Alcohol induced  Metabolic acidosis  - Co2 21.1, Anion Gap 16.9 on arrival  -CT abdomen: 1.Findings consistent with significant acute uncomplicated pancreatitis. No evidence of necrosis or focal drainable collection. No evidence of pancreatic ductal dilatation.  -Patient reports he used to be a heavy drinker, stating he would drink a pint every 2 days.  Patient reports since November he has \"significantly cut down\", states he had consumed alcohol for his birthday and pain started acutely today  - Lipase 1,213, today 475  - analgesics prn  - antiemetics prn  -Will trial clear liquid diet, discontinue IV fluids at this time  -If patient is unable to tolerate clear liquid diet then will make n.p.o. and restart IV fluids  -PPI BID  -Started IV rocephin for IAI and poss UTI, UA positive for nitrites, patient denies urinary symptoms at this time. Will repeat UA  -Discontinue lovenox due to drop in hemoglobin from 15.8 to 12.5, no known source of bleeding at this time.  A.m. labs ordered to monitor      Elevated liver enzymes-improving  - CT abdomen: Significant hepatic steatosis. No overt features of cirrhosis.. Varicosities are present extending to the stomach likely related to portal hypertension. Mild splenomegaly is present.  - unknown baseline. Unable to view records from outside hospital to compare previous admission for same  - AST 61  - ALT 38  - bilirubin 2.8  -Clear liquid diet     Electrolyte imbalance  Hypokalemia-resolved  Hypomagnesemia-resolved  - replace per protocol    -A.m. labs ordered     HTN, acute-improved  - likely 2/2 to pain  - will monitor      Tobacco dependency  - nicotine " patch         Nutrition:   Clear liquid diet    VTE Prophylaxis:  Pharmacologic VTE prophylaxis orders are present.         Code status is   Code Status and Medical Interventions:   Ordered at: 07/13/24 2305     Level Of Support Discussed With:    Patient     Code Status (Patient has no pulse and is not breathing):    CPR (Attempt to Resuscitate)     Medical Interventions (Patient has pulse or is breathing):    Full Support       Plan for disposition: Pending toleration of clear liquid diet, will transition patient to regular diet tomorrow and possible discharge tomorrow night versus 7-    Time: 30 minutes    Signature: Electronically signed by PAU Hull, 07/15/24, 12:48 EDT.  [facility] Hospitalist Team

## 2024-07-16 VITALS
WEIGHT: 205.03 LBS | DIASTOLIC BLOOD PRESSURE: 103 MMHG | RESPIRATION RATE: 20 BRPM | BODY MASS INDEX: 32.18 KG/M2 | SYSTOLIC BLOOD PRESSURE: 148 MMHG | HEART RATE: 79 BPM | TEMPERATURE: 97.8 F | HEIGHT: 67 IN | OXYGEN SATURATION: 100 %

## 2024-07-16 LAB
ANION GAP SERPL CALCULATED.3IONS-SCNC: 11.3 MMOL/L (ref 5–15)
BUN SERPL-MCNC: 6 MG/DL (ref 6–20)
BUN/CREAT SERPL: 9.7 (ref 7–25)
CALCIUM SPEC-SCNC: 8.8 MG/DL (ref 8.6–10.5)
CHLORIDE SERPL-SCNC: 98 MMOL/L (ref 98–107)
CO2 SERPL-SCNC: 24.7 MMOL/L (ref 22–29)
CREAT SERPL-MCNC: 0.62 MG/DL (ref 0.76–1.27)
DEPRECATED RDW RBC AUTO: 53.1 FL (ref 37–54)
EGFRCR SERPLBLD CKD-EPI 2021: 130.2 ML/MIN/1.73
ERYTHROCYTE [DISTWIDTH] IN BLOOD BY AUTOMATED COUNT: 14.8 % (ref 12.3–15.4)
GLUCOSE SERPL-MCNC: 156 MG/DL (ref 65–99)
HCT VFR BLD AUTO: 35.2 % (ref 37.5–51)
HGB BLD-MCNC: 12.5 G/DL (ref 13–17.7)
LIPASE SERPL-CCNC: 463 U/L (ref 13–60)
MCH RBC QN AUTO: 34.7 PG (ref 26.6–33)
MCHC RBC AUTO-ENTMCNC: 35.5 G/DL (ref 31.5–35.7)
MCV RBC AUTO: 97.8 FL (ref 79–97)
PLATELET # BLD AUTO: 79 10*3/MM3 (ref 140–450)
PMV BLD AUTO: 11.7 FL (ref 6–12)
POTASSIUM SERPL-SCNC: 3.8 MMOL/L (ref 3.5–5.2)
RBC # BLD AUTO: 3.6 10*6/MM3 (ref 4.14–5.8)
SODIUM SERPL-SCNC: 134 MMOL/L (ref 136–145)
WBC NRBC COR # BLD AUTO: 2.77 10*3/MM3 (ref 3.4–10.8)

## 2024-07-16 PROCEDURE — 83690 ASSAY OF LIPASE: CPT

## 2024-07-16 PROCEDURE — 80048 BASIC METABOLIC PNL TOTAL CA: CPT

## 2024-07-16 PROCEDURE — 25010000002 HYDROMORPHONE 1 MG/ML SOLUTION: Performed by: STUDENT IN AN ORGANIZED HEALTH CARE EDUCATION/TRAINING PROGRAM

## 2024-07-16 PROCEDURE — 85027 COMPLETE CBC AUTOMATED: CPT

## 2024-07-16 PROCEDURE — 25010000002 CEFTRIAXONE PER 250 MG: Performed by: FAMILY MEDICINE

## 2024-07-16 RX ORDER — PANTOPRAZOLE SODIUM 40 MG/1
40 TABLET, DELAYED RELEASE ORAL
Qty: 30 TABLET | Refills: 0 | Status: SHIPPED | OUTPATIENT
Start: 2024-07-16

## 2024-07-16 RX ORDER — AMLODIPINE BESYLATE 5 MG/1
5 TABLET ORAL DAILY
Qty: 30 TABLET | Refills: 0 | Status: SHIPPED | OUTPATIENT
Start: 2024-07-16

## 2024-07-16 RX ORDER — ONDANSETRON 4 MG/1
4 TABLET, ORALLY DISINTEGRATING ORAL EVERY 8 HOURS PRN
Qty: 10 TABLET | Refills: 0 | Status: SHIPPED | OUTPATIENT
Start: 2024-07-16

## 2024-07-16 RX ADMIN — HYDROMORPHONE HYDROCHLORIDE 0.5 MG: 1 INJECTION, SOLUTION INTRAMUSCULAR; INTRAVENOUS; SUBCUTANEOUS at 10:32

## 2024-07-16 RX ADMIN — HYDROMORPHONE HYDROCHLORIDE 0.5 MG: 1 INJECTION, SOLUTION INTRAMUSCULAR; INTRAVENOUS; SUBCUTANEOUS at 05:59

## 2024-07-16 RX ADMIN — HYDROCODONE BITARTRATE AND ACETAMINOPHEN 1 TABLET: 5; 325 TABLET ORAL at 01:14

## 2024-07-16 RX ADMIN — CEFTRIAXONE 2000 MG: 2 INJECTION, POWDER, FOR SOLUTION INTRAMUSCULAR; INTRAVENOUS at 08:05

## 2024-07-16 RX ADMIN — PANTOPRAZOLE SODIUM 40 MG: 40 TABLET, DELAYED RELEASE ORAL at 08:05

## 2024-07-16 RX ADMIN — Medication 10 ML: at 08:05

## 2024-07-16 NOTE — DISCHARGE SUMMARY
[facility] Medicine Services  Discharge Summary    Date of Service: 2024  Patient Name: Juvenal Vance  : 1990  MRN: [id number]    Date of Admission: 2024  Discharge Diagnosis:     Pancreatitis  Alcohol abuse   Hypertension  Fatty liver disease     Date of Discharge: 2024  Primary Care Physician: Provider, No Known      Presenting Problem:   Hypokalemia [E87.6]  Pancreatitis, acute [K85.90]  Alcohol-induced acute pancreatitis, unspecified complication status [K85.20]    Active and Resolved Hospital Problems:  Active Hospital Problems    Diagnosis POA    **Pancreatitis, acute [K85.90] Yes      Resolved Hospital Problems   No resolved problems to display.         Hospital Course     HPI:  Per the H&P  dated 2024.    Hospital Course:   Is a 34-year-old white male who presented with issues as discussed in history and physical.  I was asked to discharge patient on my initial day of contact.  He had been afebrile during his hospitalization.  Oxygenation was maintained on room air.  Blood pressure noted to show some noted elevation..  Began on antihypertensives at time of discharge follow-up with primary care provider.  Microbiology studies.  Any of abdomen showed changes consistent with his diagnosis of pancreatitis.  Fatty liver changes  Were noted as well as possible early findings of portal hypertension.  CBC was nonremarkable.  Minor electrolyte abnormalities that corrected during his hospitalization.  He had some noted transaminase elevation felt to be due to his recent alcohol binge.  Liver functions improved on repeat.  Profound anemia.  He was tolerating diet without issue.  It was thought that he could be discharged home and followed up as to his hypertension as well as his liver disease with a primary care doctor.  He states that he is going to totally discontinue his alcohol use which she had significantly cut back on prior to his recent binge.   Encouraged to seek treatment with any ongoing alcohol concerns.          Reasons For Change In Medications and Indications for New Medications:      Day of Discharge     Vital Signs:  Temp:  [97.8 °F (36.6 °C)-99.2 °F (37.3 °C)] 97.8 °F (36.6 °C)  Heart Rate:  [79-99] 79  Resp:  [15-20] 20  BP: (133-151)/(100-106) 148/103    Physical Exam:  Physical Exam  Vitals reviewed.   Constitutional:       Appearance: He is obese.   HENT:      Head: Normocephalic.   Cardiovascular:      Rate and Rhythm: Normal rate and regular rhythm.   Pulmonary:      Effort: Pulmonary effort is normal.      Breath sounds: Normal breath sounds.   Abdominal:      General: Bowel sounds are normal.      Palpations: Abdomen is soft.      Tenderness: There is no abdominal tenderness.   Neurological:      Mental Status: He is alert. Mental status is at baseline.   Psychiatric:         Behavior: Behavior normal.         Thought Content: Thought content normal.      2      Pertinent  and/or Most Recent Results     LAB RESULTS:      Lab 07/16/24  0535 07/15/24  0507 07/14/24  0538 07/13/24  1829   WBC 2.77* 3.17* 6.04 8.21   HEMOGLOBIN 12.5* 12.5* 15.8 16.9   HEMATOCRIT 35.2* 36.5* 45.0 45.8   PLATELETS 79* 71* 107* 135*   NEUTROS ABS  --  1.95 4.64 6.75   IMMATURE GRANS (ABS)  --  0.01 0.02 0.03   LYMPHS ABS  --  0.94 1.04 1.00   MONOS ABS  --  0.23 0.31 0.39   EOS ABS  --  0.03 0.02 0.01   MCV 97.8* 99.2* 95.9 93.1   SED RATE  --  5  --   --    CRP  --  10.20*  --   --          Lab 07/16/24  1013 07/15/24  0751 07/14/24  0538 07/13/24  1829   SODIUM 134* 134* 133* 139   POTASSIUM 3.8 4.3 4.0 2.9*   CHLORIDE 98 102 100 101   CO2 24.7 23.9 21.4* 21.1*   ANION GAP 11.3 8.1 11.6 16.9*   BUN 6 8 8 7   CREATININE 0.62* 0.68* 0.66* 0.82   EGFR 130.2 126.7 127.8 119.7   GLUCOSE 156* 101* 153* 146*   CALCIUM 8.8 8.4* 8.7 9.8   MAGNESIUM  --  2.0  --  1.4*         Lab 07/16/24  1013 07/15/24  0751 07/14/24  0538 07/13/24  1829   TOTAL PROTEIN  --  5.9* 6.1  7.2   ALBUMIN  --  3.5 3.7 4.3   GLOBULIN  --  2.4 2.4 2.9   ALT (SGPT)  --  38 48* 66*   AST (SGOT)  --  61* 62* 110*   BILIRUBIN  --  2.8* 2.3* 1.7*   ALK PHOS  --  121* 131* 160*   LIPASE 463* 475* 1,157* 1,213*                     Brief Urine Lab Results  (Last result in the past 365 days)        Color   Clarity   Blood   Leuk Est   Nitrite   Protein   CREAT   Urine HCG        07/15/24 1447 Yellow   Clear   Negative   Negative   Negative   Negative                 Microbiology Results (last 10 days)       ** No results found for the last 240 hours. **            CT Abdomen Pelvis With Contrast    Result Date: 7/13/2024  Impression: Impression: 1.Findings consistent with significant acute uncomplicated pancreatitis. No evidence of necrosis or focal drainable collection. No evidence of pancreatic ductal dilatation. 2.Significant hepatic steatosis. No overt features of cirrhosis.. Varicosities are present extending to the stomach likely related to portal hypertension. Mild splenomegaly is present. 3.Ancillary findings as described above. Electronically Signed: Olamide Levine MD  7/13/2024 10:18 PM EDT  Workstation ID: [id number]                 Labs Pending at Discharge:      Procedures Performed           Consults:   Consults       Date and Time Order Name Status Description    7/13/2024 10:49 PM Hospitalist (on-call MD unless specified)                Discharge Details        Discharge Medications        New Medications        Instructions Start Date   amLODIPine 5 MG tablet  Commonly known as: Norvasc   5 mg, Oral, Daily      ondansetron ODT 4 MG disintegrating tablet  Commonly known as: ZOFRAN-ODT   4 mg, Translingual, Every 8 Hours PRN      pantoprazole 40 MG EC tablet  Commonly known as: PROTONIX   40 mg, Oral, 2 Times Daily Before Meals               No Known Allergies      Discharge Disposition: home  Home or Self Care    Diet:  Hospital:  Diet Order   Procedures    Diet: Diabetic; Consistent Carbohydrate;  Fluid Consistency: Thin (IDDSI 0)         Discharge Activity:         CODE STATUS:  Code Status and Medical Interventions:   Ordered at: 07/13/24 2305     Level Of Support Discussed With:    Patient     Code Status (Patient has no pulse and is not breathing):    CPR (Attempt to Resuscitate)     Medical Interventions (Patient has pulse or is breathing):    Full Support         No future appointments.    Additional Instructions for the Follow-ups that You Need to Schedule       Discharge Follow-up with PCP   As directed       Currently Documented PCP:    Provider, No Known    PCP Phone Number:    None     Follow Up Details: one week                Time spent on Discharge including face to face service:  >30 minutes    Signature: Electronically signed by Timothy Duane Brammell, MD, 07/16/24, 15:39 EDT.  [facility] Hospitalist Team

## 2024-07-16 NOTE — PLAN OF CARE
Problem: Adult Inpatient Plan of Care  Goal: Plan of Care Review  Outcome: Ongoing, Progressing  Goal: Patient-Specific Goal (Individualized)  Outcome: Ongoing, Progressing  Goal: Absence of Hospital-Acquired Illness or Injury  Outcome: Ongoing, Progressing  Goal: Optimal Comfort and Wellbeing  Outcome: Ongoing, Progressing  Goal: Readiness for Transition of Care  Outcome: Ongoing, Progressing     Problem: Pain Acute  Goal: Acceptable Pain Control and Functional Ability  Outcome: Ongoing, Progressing     Problem: Fluid Imbalance (Pancreatitis)  Goal: Fluid Balance  Outcome: Ongoing, Progressing     Problem: Infection (Pancreatitis)  Goal: Infection Symptom Resolution  Outcome: Ongoing, Progressing     Problem: Nutrition Impaired (Pancreatitis)  Goal: Optimal Nutrition Intake  Outcome: Ongoing, Progressing     Problem: Pain (Pancreatitis)  Goal: Acceptable Pain Control  Outcome: Ongoing, Progressing     Problem: Respiratory Compromise (Pancreatitis)  Goal: Effective Oxygenation and Ventilation  Outcome: Ongoing, Progressing   Goal Outcome Evaluation:

## 2024-07-16 NOTE — CASE MANAGEMENT/SOCIAL WORK
Continued Stay Note  [facility]     Patient Name: Juvenal Vance  MRN: [id number]  Today's Date: 7/16/2024    Admit Date: 7/13/2024    Plan: DC Plan: Anticipate routine home with significant other.   Discharge Plan       Row Name 07/16/24 1307       Plan    Plan DC Plan: Anticipate routine home with significant other.    Provided Post Acute Provider List? N/A    Provided Post Acute Provider Quality & Resource List? N/A    Plan Comments CM reviewed chart documentation for clinical updates. SDOH completed per .No significant changes in condition or care plans to report.CM will continue to follow for any further needs and adjust discharge plan accordingly. DC Barriers: Cardiac monitoring, IV abx, and monitor labs.                 Expected Discharge Date and Time       Expected Discharge Date Expected Discharge Time    Jul 17, 2024           Phone communication or documentation only- no physical contact with patient or family.      Kasey Rhodes RN    Office Phone: [phone]  Office Cell:     [phone]

## 2024-07-16 NOTE — SIGNIFICANT NOTE
07/16/24 1038   Living Situation   Current Living Arrangements home   Potentially Unsafe Housing Conditions none   Food Insecurity   Within the past 12 months, you worried that your food would run out before you got the money to buy more. Never true   Within the past 12 months, the food you bought just didn't last and you didn't have money to get more. Never true   Transportation Needs   In the past 12 months, has lack of transportation kept you from medical appointments or from getting medications? no   In the past 12 months, has lack of transportation kept you from meetings, work, or from getting things needed for daily living? No   Utilities   In the past 12 months has the electric, gas, oil, or water company threatened to shut off services in your home? No   Abuse Screen (yes response referral indicated)   Feels Unsafe at Home or Work/School no   Feels Threatened by Someone no   Does Anyone Try to Keep You From Having Contact with Others or Doing Things Outside Your Home? no   Physical Signs of Abuse Present no   Financial Resource Strain   How hard is it for you to pay for the very basics like food, housing, medical care, and heating? Not very   Employment   Do you want help finding or keeping work or a job? I do not need or want help   Family and Community Support   If for any reason you need help with day-to-day activities such as bathing, preparing meals, shopping, managing finances, etc., do you get the help you need? I get all the help I need   How often do you feel lonely or isolated from those around you? Never   Education   Preferred Language English   Do you want help with school or training? For example, starting or completing job training or getting a high school diploma, GED or equivalent No   Physical Activity   On average, how many days per week do you engage in moderate to strenuous exercise (like a brisk walk)? 0 days   On average, how many minutes do you engage in exercise at this level? 0  min   Number of minutes of exercise per week (!) 0   Alcohol Use   Q1: How often do you have a drink containing alcohol? Never   Q2: How many drinks containing alcohol do you have on a typical day when you are drinking? 1 or 2   Q3: How often do you have six or more drinks on one occasion? Less than mo   Stress   Do you feel stress - tense, restless, nervous, or anxious, or unable to sleep at night because your mind is troubled all the time - these days? Only a littl   Mental Health   Little Interest or Pleasure in Doing Things 0-->not at all   Feeling Down, Depressed or Hopeless 0-->not at all   Disabilities   Concentrating, Remembering or Making Decisions Difficulty no   Doing Errands Independently Difficulty (such as shopping) no

## 2024-07-17 NOTE — PAYOR COMM NOTE
"Kinza Regalado (34 y.o. Male)  1990  Policy no. [id number]   DC Notice - Pt dc'd 24 Routine to home    AUTHORIZATION PENDING- for IP Admission  PLEASE FORWARD DETERMINATION TO FOLLOWING CONTACT:    TOMAS WU LPN UR  Utilization Review Nurse  [facility]  Direct & confidential phone # [phone]  Fax # [phone]        Date of Birth   1990    Social Security Number       Address   [address] [facility] [place] [address]    Home Phone   [phone]    MRN   [id number]       Scientologist   None    Marital Status   Single                            Admission Date   24    Admission Type   Emergency    Admitting Provider   Llanes Alvarez, Carlos, MD    Attending Provider       Department, Room/Bed   [facility], 1116/       Discharge Date   2024    Discharge Disposition   Home or Self Care    Discharge Destination   Home                              Attending Provider: (none)   Allergies: No Known Allergies    Isolation: None   Infection: None   Code Status: Prior    Ht: 170.2 cm (67.01\")   Wt: 93 kg (205 lb 0.4 oz)    Admission Cmt: None   Principal Problem: Pancreatitis, acute [K85.90]                   Active Insurance as of 2024       Primary Coverage       Payor Plan Insurance Group Employer/Plan Group    [place]        Payor Plan Address Payor Plan Phone Number Payor Plan Fax Number Effective Dates    [address]   2024 - None Entered    [place] [address]         Subscriber Name Subscriber Birth Date Member ID       KINZA REGALADO 1990 [id number]                     Emergency Contacts        (Rel.) Home Phone Work Phone Mobile Phone    AUSTIN PAYNE (Significant Other) -- -- [phone]                 Discharge Summary        Brammell, Timothy Duane, MD at 24 1530                       [facility]  Discharge Summary    Date of Service: " 2024  Patient Name: Juvenal Vance  : 1990  MRN: [id number]    Date of Admission: 2024  Discharge Diagnosis:     Pancreatitis  Alcohol abuse   Hypertension  Fatty liver disease     Date of Discharge: 2024  Primary Care Physician: Provider, No Known      Presenting Problem:   Hypokalemia [E87.6]  Pancreatitis, acute [K85.90]  Alcohol-induced acute pancreatitis, unspecified complication status [K85.20]    Active and Resolved Hospital Problems:  Active Hospital Problems    Diagnosis POA    **Pancreatitis, acute [K85.90] Yes      Resolved Hospital Problems   No resolved problems to display.         Hospital Course     HPI:  Per the H&P  dated 2024.    Hospital Course:   Is a 34-year-old white male who presented with issues as discussed in history and physical.  I was asked to discharge patient on my initial day of contact.  He had been afebrile during his hospitalization.  Oxygenation was maintained on room air.  Blood pressure noted to show some noted elevation..  Began on antihypertensives at time of discharge follow-up with primary care provider.  Microbiology studies.  Any of abdomen showed changes consistent with his diagnosis of pancreatitis.  Fatty liver changes  Were noted as well as possible early findings of portal hypertension.  CBC was nonremarkable.  Minor electrolyte abnormalities that corrected during his hospitalization.  He had some noted transaminase elevation felt to be due to his recent alcohol binge.  Liver functions improved on repeat.  Profound anemia.  He was tolerating diet without issue.  It was thought that he could be discharged home and followed up as to his hypertension as well as his liver disease with a primary care doctor.  He states that he is going to totally discontinue his alcohol use which she had significantly cut back on prior to his recent binge.  Encouraged to seek treatment with any ongoing alcohol concerns.          Reasons For  Change In Medications and Indications for New Medications:      Day of Discharge     Vital Signs:  Temp:  [97.8 °F (36.6 °C)-99.2 °F (37.3 °C)] 97.8 °F (36.6 °C)  Heart Rate:  [79-99] 79  Resp:  [15-20] 20  BP: (133-151)/(100-106) 148/103    Physical Exam:  Physical Exam  Vitals reviewed.   Constitutional:       Appearance: He is obese.   HENT:      Head: Normocephalic.   Cardiovascular:      Rate and Rhythm: Normal rate and regular rhythm.   Pulmonary:      Effort: Pulmonary effort is normal.      Breath sounds: Normal breath sounds.   Abdominal:      General: Bowel sounds are normal.      Palpations: Abdomen is soft.      Tenderness: There is no abdominal tenderness.   Neurological:      Mental Status: He is alert. Mental status is at baseline.   Psychiatric:         Behavior: Behavior normal.         Thought Content: Thought content normal.      2      Pertinent  and/or Most Recent Results     LAB RESULTS:      Lab 07/16/24  0535 07/15/24  0507 07/14/24  0538 07/13/24  1829   WBC 2.77* 3.17* 6.04 8.21   HEMOGLOBIN 12.5* 12.5* 15.8 16.9   HEMATOCRIT 35.2* 36.5* 45.0 45.8   PLATELETS 79* 71* 107* 135*   NEUTROS ABS  --  1.95 4.64 6.75   IMMATURE GRANS (ABS)  --  0.01 0.02 0.03   LYMPHS ABS  --  0.94 1.04 1.00   MONOS ABS  --  0.23 0.31 0.39   EOS ABS  --  0.03 0.02 0.01   MCV 97.8* 99.2* 95.9 93.1   SED RATE  --  5  --   --    CRP  --  10.20*  --   --          Lab 07/16/24  1013 07/15/24  0751 07/14/24  0538 07/13/24  1829   SODIUM 134* 134* 133* 139   POTASSIUM 3.8 4.3 4.0 2.9*   CHLORIDE 98 102 100 101   CO2 24.7 23.9 21.4* 21.1*   ANION GAP 11.3 8.1 11.6 16.9*   BUN 6 8 8 7   CREATININE 0.62* 0.68* 0.66* 0.82   EGFR 130.2 126.7 127.8 119.7   GLUCOSE 156* 101* 153* 146*   CALCIUM 8.8 8.4* 8.7 9.8   MAGNESIUM  --  2.0  --  1.4*         Lab 07/16/24  1013 07/15/24  0751 07/14/24  0538 07/13/24  1829   TOTAL PROTEIN  --  5.9* 6.1 7.2   ALBUMIN  --  3.5 3.7 4.3   GLOBULIN  --  2.4 2.4 2.9   ALT (SGPT)  --  38 48* 66*    AST (SGOT)  --  61* 62* 110*   BILIRUBIN  --  2.8* 2.3* 1.7*   ALK PHOS  --  121* 131* 160*   LIPASE 463* 475* 1,157* 1,213*                     Brief Urine Lab Results  (Last result in the past 365 days)        Color   Clarity   Blood   Leuk Est   Nitrite   Protein   CREAT   Urine HCG        07/15/24 1447 Yellow   Clear   Negative   Negative   Negative   Negative                 Microbiology Results (last 10 days)       ** No results found for the last 240 hours. **            CT Abdomen Pelvis With Contrast    Result Date: 7/13/2024  Impression: Impression: 1.Findings consistent with significant acute uncomplicated pancreatitis. No evidence of necrosis or focal drainable collection. No evidence of pancreatic ductal dilatation. 2.Significant hepatic steatosis. No overt features of cirrhosis.. Varicosities are present extending to the stomach likely related to portal hypertension. Mild splenomegaly is present. 3.Ancillary findings as described above. Electronically Signed: Olamide Levine MD  7/13/2024 10:18 PM EDT  Workstation ID: [id number]                 Labs Pending at Discharge:      Procedures Performed           Consults:   Consults       Date and Time Order Name Status Description    7/13/2024 10:49 PM Hospitalist (on-call MD unless specified)                Discharge Details        Discharge Medications        New Medications        Instructions Start Date   amLODIPine 5 MG tablet  Commonly known as: Norvasc   5 mg, Oral, Daily      ondansetron ODT 4 MG disintegrating tablet  Commonly known as: ZOFRAN-ODT   4 mg, Translingual, Every 8 Hours PRN      pantoprazole 40 MG EC tablet  Commonly known as: PROTONIX   40 mg, Oral, 2 Times Daily Before Meals               No Known Allergies      Discharge Disposition: home  Home or Self Care    Diet:  Hospital:  Diet Order   Procedures    Diet: Diabetic; Consistent Carbohydrate; Fluid Consistency: Thin (IDDSI 0)         Discharge Activity:         CODE STATUS:  Code  Status and Medical Interventions:   Ordered at: 07/13/24 230     Level Of Support Discussed With:    Patient     Code Status (Patient has no pulse and is not breathing):    CPR (Attempt to Resuscitate)     Medical Interventions (Patient has pulse or is breathing):    Full Support         No future appointments.    Additional Instructions for the Follow-ups that You Need to Schedule       Discharge Follow-up with PCP   As directed       Currently Documented PCP:    Provider, No Known    PCP Phone Number:    None     Follow Up Details: one week                Time spent on Discharge including face to face service:  >30 minutes    Signature: Electronically signed by Timothy Duane Brammell, MD, 07/16/24, 15:39 EDT.  [facility] Hospitalist Team     Electronically signed by Brammell, Timothy Duane, MD at 07/16/24 9034

## 2025-01-15 ENCOUNTER — APPOINTMENT (OUTPATIENT)
Dept: CT IMAGING | Facility: HOSPITAL | Age: 35
DRG: 439 | End: 2025-01-15

## 2025-01-15 ENCOUNTER — HOSPITAL ENCOUNTER (INPATIENT)
Facility: HOSPITAL | Age: 35
LOS: 3 days | Discharge: HOME | DRG: 439 | End: 2025-01-18
Attending: STUDENT IN AN ORGANIZED HEALTH CARE EDUCATION/TRAINING PROGRAM | Admitting: STUDENT IN AN ORGANIZED HEALTH CARE EDUCATION/TRAINING PROGRAM

## 2025-01-15 DIAGNOSIS — R11.2 NAUSEA AND VOMITING, UNSPECIFIED VOMITING TYPE: ICD-10-CM

## 2025-01-15 DIAGNOSIS — E87.6 HYPOKALEMIA: ICD-10-CM

## 2025-01-15 DIAGNOSIS — K85.20 ALCOHOL-INDUCED ACUTE PANCREATITIS WITHOUT INFECTION OR NECROSIS: ICD-10-CM

## 2025-01-15 DIAGNOSIS — R10.10 UPPER ABDOMINAL PAIN: Primary | ICD-10-CM

## 2025-01-15 LAB
ALBUMIN SERPL-MCNC: 4.2 G/DL (ref 3.5–5.2)
ALBUMIN/GLOB SERPL: 1.4 G/DL
ALP SERPL-CCNC: 232 U/L (ref 39–117)
ALT SERPL W P-5'-P-CCNC: 105 U/L (ref 1–41)
AMPHET+METHAMPHET UR QL: NEGATIVE
AMPHETAMINES UR QL: NEGATIVE
ANION GAP SERPL CALCULATED.3IONS-SCNC: 22.6 MMOL/L (ref 5–15)
AST SERPL-CCNC: 177 U/L (ref 1–40)
BARBITURATES UR QL SCN: NEGATIVE
BASOPHILS # BLD AUTO: 0.04 10*3/MM3 (ref 0–0.2)
BASOPHILS NFR BLD AUTO: 0.3 % (ref 0–1.5)
BENZODIAZ UR QL SCN: NEGATIVE
BILIRUB SERPL-MCNC: 1.8 MG/DL (ref 0–1.2)
BILIRUB UR QL STRIP: NEGATIVE
BUN SERPL-MCNC: 7 MG/DL (ref 6–20)
BUN/CREAT SERPL: 9.3 (ref 7–25)
BUPRENORPHINE SERPL-MCNC: NEGATIVE NG/ML
CALCIUM SPEC-SCNC: 9.1 MG/DL (ref 8.6–10.5)
CANNABINOIDS SERPL QL: NEGATIVE
CHLORIDE SERPL-SCNC: 96 MMOL/L (ref 98–107)
CLARITY UR: CLEAR
CO2 SERPL-SCNC: 18.4 MMOL/L (ref 22–29)
COCAINE UR QL: NEGATIVE
COLOR UR: ABNORMAL
CREAT SERPL-MCNC: 0.75 MG/DL (ref 0.76–1.27)
DEPRECATED RDW RBC AUTO: 55.2 FL (ref 37–54)
EGFRCR SERPLBLD CKD-EPI 2021: 123 ML/MIN/1.73
EOSINOPHIL # BLD AUTO: 0.01 10*3/MM3 (ref 0–0.4)
EOSINOPHIL NFR BLD AUTO: 0.1 % (ref 0.3–6.2)
ERYTHROCYTE [DISTWIDTH] IN BLOOD BY AUTOMATED COUNT: 15.4 % (ref 12.3–15.4)
ETHANOL UR QL: 0.01 %
GLOBULIN UR ELPH-MCNC: 3 GM/DL
GLUCOSE BLDC GLUCOMTR-MCNC: 118 MG/DL (ref 70–105)
GLUCOSE SERPL-MCNC: 138 MG/DL (ref 65–99)
GLUCOSE UR STRIP-MCNC: NEGATIVE MG/DL
HCT VFR BLD AUTO: 45 % (ref 37.5–51)
HGB BLD-MCNC: 16.2 G/DL (ref 13–17.7)
HGB UR QL STRIP.AUTO: NEGATIVE
IMM GRANULOCYTES # BLD AUTO: 0.05 10*3/MM3 (ref 0–0.05)
IMM GRANULOCYTES NFR BLD AUTO: 0.4 % (ref 0–0.5)
KETONES UR QL STRIP: ABNORMAL
LEUKOCYTE ESTERASE UR QL STRIP.AUTO: NEGATIVE
LIPASE SERPL-CCNC: 707 U/L (ref 13–60)
LYMPHOCYTES # BLD AUTO: 2.09 10*3/MM3 (ref 0.7–3.1)
LYMPHOCYTES NFR BLD AUTO: 18 % (ref 19.6–45.3)
MAGNESIUM SERPL-MCNC: 1.6 MG/DL (ref 1.6–2.6)
MCH RBC QN AUTO: 35.1 PG (ref 26.6–33)
MCHC RBC AUTO-ENTMCNC: 36 G/DL (ref 31.5–35.7)
MCV RBC AUTO: 97.4 FL (ref 79–97)
METHADONE UR QL SCN: NEGATIVE
MONOCYTES # BLD AUTO: 0.55 10*3/MM3 (ref 0.1–0.9)
MONOCYTES NFR BLD AUTO: 4.7 % (ref 5–12)
NEUTROPHILS NFR BLD AUTO: 76.5 % (ref 42.7–76)
NEUTROPHILS NFR BLD AUTO: 8.84 10*3/MM3 (ref 1.7–7)
NITRITE UR QL STRIP: NEGATIVE
NRBC BLD AUTO-RTO: 0 /100 WBC (ref 0–0.2)
OPIATES UR QL: POSITIVE
OXYCODONE UR QL SCN: NEGATIVE
PCP UR QL SCN: NEGATIVE
PH UR STRIP.AUTO: 8 [PH] (ref 5–8)
PLATELET # BLD AUTO: 212 10*3/MM3 (ref 140–450)
PMV BLD AUTO: 8.7 FL (ref 6–12)
POTASSIUM SERPL-SCNC: 2.7 MMOL/L (ref 3.5–5.2)
PROT SERPL-MCNC: 7.2 G/DL (ref 6–8.5)
PROT UR QL STRIP: ABNORMAL
RBC # BLD AUTO: 4.62 10*6/MM3 (ref 4.14–5.8)
SODIUM SERPL-SCNC: 137 MMOL/L (ref 136–145)
SP GR UR STRIP: 1.08 (ref 1–1.03)
TRICYCLICS UR QL SCN: NEGATIVE
UROBILINOGEN UR QL STRIP: ABNORMAL
WBC NRBC COR # BLD AUTO: 11.58 10*3/MM3 (ref 3.4–10.8)

## 2025-01-15 PROCEDURE — 25010000002 MORPHINE PER 10 MG

## 2025-01-15 PROCEDURE — 80306 DRUG TEST PRSMV INSTRMNT: CPT | Performed by: NURSE PRACTITIONER

## 2025-01-15 PROCEDURE — 82948 REAGENT STRIP/BLOOD GLUCOSE: CPT

## 2025-01-15 PROCEDURE — 82077 ASSAY SPEC XCP UR&BREATH IA: CPT | Performed by: NURSE PRACTITIONER

## 2025-01-15 PROCEDURE — 83690 ASSAY OF LIPASE: CPT | Performed by: NURSE PRACTITIONER

## 2025-01-15 PROCEDURE — 25010000002 POTASSIUM CHLORIDE 10 MEQ/100ML SOLUTION: Performed by: STUDENT IN AN ORGANIZED HEALTH CARE EDUCATION/TRAINING PROGRAM

## 2025-01-15 PROCEDURE — 83735 ASSAY OF MAGNESIUM: CPT | Performed by: NURSE PRACTITIONER

## 2025-01-15 PROCEDURE — 25010000002 ONDANSETRON PER 1 MG: Performed by: NURSE PRACTITIONER

## 2025-01-15 PROCEDURE — 99285 EMERGENCY DEPT VISIT HI MDM: CPT

## 2025-01-15 PROCEDURE — 25010000002 ONDANSETRON PER 1 MG

## 2025-01-15 PROCEDURE — 25810000003 SODIUM CHLORIDE 0.9 % SOLUTION: Performed by: NURSE PRACTITIONER

## 2025-01-15 PROCEDURE — 74177 CT ABD & PELVIS W/CONTRAST: CPT

## 2025-01-15 PROCEDURE — 25810000003 SODIUM CHLORIDE 0.9 % SOLUTION

## 2025-01-15 PROCEDURE — 25010000002 LORAZEPAM PER 2 MG

## 2025-01-15 PROCEDURE — 25010000002 HYDROMORPHONE 1 MG/ML SOLUTION: Performed by: NURSE PRACTITIONER

## 2025-01-15 PROCEDURE — 85025 COMPLETE CBC W/AUTO DIFF WBC: CPT | Performed by: NURSE PRACTITIONER

## 2025-01-15 PROCEDURE — 81003 URINALYSIS AUTO W/O SCOPE: CPT | Performed by: NURSE PRACTITIONER

## 2025-01-15 PROCEDURE — 25510000001 IOPAMIDOL PER 1 ML: Performed by: NURSE PRACTITIONER

## 2025-01-15 PROCEDURE — 80053 COMPREHEN METABOLIC PANEL: CPT | Performed by: NURSE PRACTITIONER

## 2025-01-15 RX ORDER — LORAZEPAM 1 MG/1
1 TABLET ORAL EVERY 12 HOURS SCHEDULED
Status: COMPLETED | OUTPATIENT
Start: 2025-01-17 | End: 2025-01-18

## 2025-01-15 RX ORDER — LORAZEPAM 2 MG/ML
2 INJECTION INTRAMUSCULAR
Status: DISCONTINUED | OUTPATIENT
Start: 2025-01-15 | End: 2025-01-18 | Stop reason: HOSPADM

## 2025-01-15 RX ORDER — POLYETHYLENE GLYCOL 3350 17 G/17G
17 POWDER, FOR SOLUTION ORAL DAILY PRN
Status: DISCONTINUED | OUTPATIENT
Start: 2025-01-15 | End: 2025-01-18 | Stop reason: HOSPADM

## 2025-01-15 RX ORDER — ONDANSETRON 2 MG/ML
4 INJECTION INTRAMUSCULAR; INTRAVENOUS ONCE
Status: COMPLETED | OUTPATIENT
Start: 2025-01-15 | End: 2025-01-15

## 2025-01-15 RX ORDER — SODIUM CHLORIDE 9 MG/ML
150 INJECTION, SOLUTION INTRAVENOUS CONTINUOUS
Status: DISPENSED | OUTPATIENT
Start: 2025-01-15 | End: 2025-01-16

## 2025-01-15 RX ORDER — LORAZEPAM 2 MG/ML
1 INJECTION INTRAMUSCULAR
Status: DISCONTINUED | OUTPATIENT
Start: 2025-01-15 | End: 2025-01-18 | Stop reason: HOSPADM

## 2025-01-15 RX ORDER — ONDANSETRON 2 MG/ML
4 INJECTION INTRAMUSCULAR; INTRAVENOUS EVERY 6 HOURS PRN
Status: DISCONTINUED | OUTPATIENT
Start: 2025-01-15 | End: 2025-01-18 | Stop reason: HOSPADM

## 2025-01-15 RX ORDER — IOPAMIDOL 755 MG/ML
100 INJECTION, SOLUTION INTRAVASCULAR
Status: COMPLETED | OUTPATIENT
Start: 2025-01-15 | End: 2025-01-15

## 2025-01-15 RX ORDER — AMOXICILLIN 250 MG
2 CAPSULE ORAL 2 TIMES DAILY PRN
Status: DISCONTINUED | OUTPATIENT
Start: 2025-01-15 | End: 2025-01-18 | Stop reason: HOSPADM

## 2025-01-15 RX ORDER — LORAZEPAM 1 MG/1
1 TABLET ORAL
Status: DISCONTINUED | OUTPATIENT
Start: 2025-01-15 | End: 2025-01-18 | Stop reason: HOSPADM

## 2025-01-15 RX ORDER — MORPHINE SULFATE 2 MG/ML
2 INJECTION, SOLUTION INTRAMUSCULAR; INTRAVENOUS EVERY 4 HOURS PRN
Status: DISCONTINUED | OUTPATIENT
Start: 2025-01-15 | End: 2025-01-18 | Stop reason: HOSPADM

## 2025-01-15 RX ORDER — BISACODYL 10 MG
10 SUPPOSITORY, RECTAL RECTAL DAILY PRN
Status: DISCONTINUED | OUTPATIENT
Start: 2025-01-15 | End: 2025-01-18 | Stop reason: HOSPADM

## 2025-01-15 RX ORDER — NALOXONE HCL 0.4 MG/ML
0.4 VIAL (ML) INJECTION
Status: DISCONTINUED | OUTPATIENT
Start: 2025-01-15 | End: 2025-01-18 | Stop reason: HOSPADM

## 2025-01-15 RX ORDER — POTASSIUM CHLORIDE 7.45 MG/ML
10 INJECTION INTRAVENOUS
Status: COMPLETED | OUTPATIENT
Start: 2025-01-15 | End: 2025-01-16

## 2025-01-15 RX ORDER — FOLIC ACID 1 MG/1
1 TABLET ORAL DAILY
Status: DISCONTINUED | OUTPATIENT
Start: 2025-01-15 | End: 2025-01-18 | Stop reason: HOSPADM

## 2025-01-15 RX ORDER — LORAZEPAM 1 MG/1
1 TABLET ORAL EVERY 6 HOURS
Status: COMPLETED | OUTPATIENT
Start: 2025-01-16 | End: 2025-01-17

## 2025-01-15 RX ORDER — LORAZEPAM 1 MG/1
2 TABLET ORAL EVERY 6 HOURS
Status: DISPENSED | OUTPATIENT
Start: 2025-01-15 | End: 2025-01-16

## 2025-01-15 RX ORDER — BISACODYL 5 MG/1
5 TABLET, DELAYED RELEASE ORAL DAILY PRN
Status: DISCONTINUED | OUTPATIENT
Start: 2025-01-15 | End: 2025-01-18 | Stop reason: HOSPADM

## 2025-01-15 RX ORDER — PANTOPRAZOLE SODIUM 40 MG/10ML
40 INJECTION, POWDER, LYOPHILIZED, FOR SOLUTION INTRAVENOUS
Status: DISCONTINUED | OUTPATIENT
Start: 2025-01-15 | End: 2025-01-18 | Stop reason: HOSPADM

## 2025-01-15 RX ORDER — LORAZEPAM 1 MG/1
2 TABLET ORAL
Status: DISCONTINUED | OUTPATIENT
Start: 2025-01-15 | End: 2025-01-18 | Stop reason: HOSPADM

## 2025-01-15 RX ORDER — HYDROCODONE BITARTRATE AND ACETAMINOPHEN 5; 325 MG/1; MG/1
1 TABLET ORAL EVERY 6 HOURS PRN
Status: DISCONTINUED | OUTPATIENT
Start: 2025-01-15 | End: 2025-01-18 | Stop reason: HOSPADM

## 2025-01-15 RX ORDER — SODIUM CHLORIDE 0.9 % (FLUSH) 0.9 %
10 SYRINGE (ML) INJECTION AS NEEDED
Status: DISCONTINUED | OUTPATIENT
Start: 2025-01-15 | End: 2025-01-18 | Stop reason: HOSPADM

## 2025-01-15 RX ORDER — LORAZEPAM 1 MG/1
1 TABLET ORAL DAILY
Status: DISCONTINUED | OUTPATIENT
Start: 2025-01-18 | End: 2025-01-18 | Stop reason: HOSPADM

## 2025-01-15 RX ADMIN — ONDANSETRON 4 MG: 2 INJECTION INTRAMUSCULAR; INTRAVENOUS at 17:17

## 2025-01-15 RX ADMIN — MORPHINE SULFATE 2 MG: 2 INJECTION, SOLUTION INTRAMUSCULAR; INTRAVENOUS at 16:41

## 2025-01-15 RX ADMIN — ONDANSETRON 4 MG: 2 INJECTION INTRAMUSCULAR; INTRAVENOUS at 12:59

## 2025-01-15 RX ADMIN — POTASSIUM CHLORIDE 10 MEQ: 10 INJECTION, SOLUTION INTRAVENOUS at 21:18

## 2025-01-15 RX ADMIN — POTASSIUM CHLORIDE 10 MEQ: 10 INJECTION, SOLUTION INTRAVENOUS at 19:57

## 2025-01-15 RX ADMIN — POTASSIUM CHLORIDE 10 MEQ: 10 INJECTION, SOLUTION INTRAVENOUS at 22:36

## 2025-01-15 RX ADMIN — POTASSIUM CHLORIDE 10 MEQ: 10 INJECTION, SOLUTION INTRAVENOUS at 17:22

## 2025-01-15 RX ADMIN — IOPAMIDOL 100 ML: 755 INJECTION, SOLUTION INTRAVENOUS at 14:32

## 2025-01-15 RX ADMIN — MORPHINE SULFATE 2 MG: 2 INJECTION, SOLUTION INTRAMUSCULAR; INTRAVENOUS at 20:31

## 2025-01-15 RX ADMIN — HYDROMORPHONE HYDROCHLORIDE 0.5 MG: 1 INJECTION, SOLUTION INTRAMUSCULAR; INTRAVENOUS; SUBCUTANEOUS at 13:00

## 2025-01-15 RX ADMIN — HYDROMORPHONE HYDROCHLORIDE 1 MG: 1 INJECTION, SOLUTION INTRAMUSCULAR; INTRAVENOUS; SUBCUTANEOUS at 14:46

## 2025-01-15 RX ADMIN — SODIUM CHLORIDE 150 ML/HR: 9 INJECTION, SOLUTION INTRAVENOUS at 17:17

## 2025-01-15 RX ADMIN — PANTOPRAZOLE SODIUM 40 MG: 40 INJECTION, POWDER, FOR SOLUTION INTRAVENOUS at 17:17

## 2025-01-15 RX ADMIN — LORAZEPAM 1 MG: 2 INJECTION INTRAMUSCULAR; INTRAVENOUS at 20:35

## 2025-01-15 RX ADMIN — SODIUM CHLORIDE 1000 ML: 9 INJECTION, SOLUTION INTRAVENOUS at 19:56

## 2025-01-15 RX ADMIN — SODIUM CHLORIDE 1000 ML: 9 INJECTION, SOLUTION INTRAVENOUS at 12:59

## 2025-01-15 RX ADMIN — POTASSIUM CHLORIDE 10 MEQ: 10 INJECTION, SOLUTION INTRAVENOUS at 18:30

## 2025-01-15 NOTE — Clinical Note
Level of Care: Med/Surg [1]   Admitting Physician: SAMUEL WEBSTER [[id number]]   Attending Physician: SAMUEL WEBSTER [[id number]]

## 2025-01-15 NOTE — ED PROVIDER NOTES
Subjective   History of Present Illness  Patient is a 34-year-old white male with history of alcohol abuse with history of recurrent alcoholic pancreatitis presents to the ED today with complaints of upper abdominal pain nausea vomiting that started 10:00 this morning.  He states he is still drinking daily.  He last drank alcohol last night.  Denies any diarrhea black or bloody stools.  No fever chills chest pain shortness of breath.      Review of Systems   Constitutional:  Negative for fever.   Respiratory:  Negative for shortness of breath.    Cardiovascular:  Negative for chest pain.   Gastrointestinal:  Positive for abdominal pain, nausea and vomiting. Negative for diarrhea.       No past medical history on file.    No Known Allergies    No past surgical history on file.    No family history on file.    Social History     Socioeconomic History    Marital status: Single   Tobacco Use    Smoking status: Every Day     Current packs/day: 0.50     Average packs/day: 0.5 packs/day for 6.0 years (3.0 ttl pk-yrs)     Types: Cigarettes     Start date: 2019   Vaping Use    Vaping status: Every Day   Substance and Sexual Activity    Alcohol use: Yes    Drug use: Never    Sexual activity: Defer           Objective   Physical Exam  Vital signs and triage nurse note reviewed.  Constitutional: Awake, alert; well-developed and well-nourished. No acute distress is noted.  Appears uncomfortable, leaned over in the bed holding emesis bag.  HEENT: Normocephalic, atraumatic; pupils are PERRL with intact EOM; oropharynx is pink and moist without exudate or erythema.  No drooling or pooling of oral secretions.  Neck: Supple, full range of motion without pain; no cervical lymphadenopathy. Normal phonation.  Cardiovascular: Regular rate and rhythm, normal S1-S2.  No murmur noted.  Pulmonary: Respiratory effort regular nonlabored, breath sounds clear to auscultation all fields.  Abdomen: Soft, tender in the upper abdomen, nondistended  with normoactive bowel sounds; no rebound or guarding.  No CVAT.  No rash.  Musculoskeletal: Independent range of motion of all extremities with no palpable tenderness or edema.   Skin: Flesh tone, warm, dry, intact; no erythematous or petechial rash or lesion.    Procedures           ED Course      Labs Reviewed   COMPREHENSIVE METABOLIC PANEL - Abnormal; Notable for the following components:       Result Value    Glucose 138 (*)     Creatinine 0.75 (*)     Potassium 2.7 (*)     Chloride 96 (*)     CO2 18.4 (*)     ALT (SGPT) 105 (*)     AST (SGOT) 177 (*)     Alkaline Phosphatase 232 (*)     Total Bilirubin 1.8 (*)     Anion Gap 22.6 (*)     All other components within normal limits    Narrative:     GFR Categories in Chronic Kidney Disease (CKD)      GFR Category          GFR (mL/min/1.73)    Interpretation  G1                     90 or greater         Normal or high (1)  G2                      60-89                Mild decrease (1)  G3a                   45-59                Mild to moderate decrease  G3b                   30-44                Moderate to severe decrease  G4                    15-29                Severe decrease  G5                    14 or less           Kidney failure          (1)In the absence of evidence of kidney disease, neither GFR category G1 or G2 fulfill the criteria for CKD.    eGFR calculation 2021 CKD-EPI creatinine equation, which does not include race as a factor   LIPASE - Abnormal; Notable for the following components:    Lipase 707 (*)     All other components within normal limits   CBC WITH AUTO DIFFERENTIAL - Abnormal; Notable for the following components:    WBC 11.58 (*)     MCV 97.4 (*)     MCH 35.1 (*)     MCHC 36.0 (*)     RDW-SD 55.2 (*)     Neutrophil % 76.5 (*)     Lymphocyte % 18.0 (*)     Monocyte % 4.7 (*)     Eosinophil % 0.1 (*)     Neutrophils, Absolute 8.84 (*)     All other components within normal limits   MAGNESIUM - Normal   ETHANOL    Narrative:      Plasma Ethanol Clinical Symptoms:    ETOH (%)               Clinical Symptom  .01-.05              No apparent influence  .03-.12              Euphoria, Diminished judgment and attention   .09-.25              Impaired comprehension, Muscle incoordination  .18-.30              Confusion, Staggered gait, Slurred speech  .25-.40              Markedly decreased response to stimuli, unable to stand or                        walk, vomitting, sleep or stupor  .35-.50              Comatose, Anesthesia, Subnormal body temperature       URINALYSIS W/ MICROSCOPIC IF INDICATED (NO CULTURE)   URINE DRUG SCREEN   CBC AND DIFFERENTIAL    Narrative:     The following orders were created for panel order CBC & Differential.  Procedure                               Abnormality         Status                     ---------                               -----------         ------                     CBC Auto Differential[[id number]]        Abnormal            Final result                 Please view results for these tests on the individual orders.     CT Abdomen Pelvis With Contrast    Result Date: 1/15/2025  Impression: 1.Acute interstitial edematous pancreatitis. No evidence of pancreatic necrosis. 2.Hepatomegaly with severe hepatic steatosis. 3.Left upper quadrant varices and collaterals, some of which appear endoluminal within the gastric cardia. 4.Small sliding-type hiatal hernia. Electronically Signed: Wilmar Suarez  1/15/2025 2:51 PM EST  Workstation ID: [id number]   Medications   sodium chloride 0.9 % flush 10 mL (has no administration in time range)   Potassium Replacement - Follow Nurse / BPA Driven Protocol (has no administration in time range)   sodium chloride 0.9 % bolus 1,000 mL (1,000 mL Intravenous New Bag 1/15/25 1259)   HYDROmorphone (DILAUDID) injection 0.5 mg (0.5 mg Intravenous Given 1/15/25 1300)   ondansetron (ZOFRAN) injection 4 mg (4 mg Intravenous Given 1/15/25 1259)   iopamidol (ISOVUE-370) 76 % injection 100  mL (100 mL Intravenous Given 1/15/25 1432)   HYDROmorphone (DILAUDID) injection 1 mg (1 mg Intravenous Given 1/15/25 1446)                                                      Medical Decision Making  Patient presents today with the above complaint.    He had the above exam and evaluation.  IV was established.  Labs and CT were obtained.  He was given a liter of IV fluids.  He is given IV Dilaudid and Zofran for his pain and nausea.    Workup: CBC was significant for WBC 11.5, 76% neutrophils, no bands.  Metabolic panel significant for potassium of 2.7, CO2 18.4, , , alk phos 232, total bili 1.8.  Lipase 707.  Normal magnesium.  Ethanol 0.014%.  CT of the abdomen pelvis was independently interpreted by Dr. Che, reviewed by myself and shows concern for pericarditis, radiologist reads acute interstitial edematous pancreatitis.  No evidence for pancreatic necrosis.  Hepatomegaly with severe hepatic steatosis.  Left upper quadrant varices and collaterals    On reexamination patient resting quietly no distress.  No new complaints.  Potassium replacement protocol was ordered.  He has had some improvement in his nausea and pain but reports continued pain.  He is afebrile hemodynamically stable.    Findings were discussed with him.  He will be admitted to the hospital for further management.  Case was discussed with hospitalist PA.    Problems Addressed:  Alcohol-induced acute pancreatitis without infection or necrosis: complicated acute illness or injury  Nausea and vomiting, unspecified vomiting type: complicated acute illness or injury  Upper abdominal pain: complicated acute illness or injury    Amount and/or Complexity of Data Reviewed  Labs: ordered.  Radiology: ordered.    Risk  OTC drugs.  Prescription drug management.  Decision regarding hospitalization.        Final diagnoses:   Upper abdominal pain   Nausea and vomiting, unspecified vomiting type   Alcohol-induced acute pancreatitis without  infection or necrosis   Hypokalemia       ED Disposition  ED Disposition       ED Disposition   Decision to Admit    Condition   --    Comment   Level of Care: Med/Surg [1]   Admitting Physician: SAMUEL WEBSTER [[id number]]   Attending Physician: SAMUEL WEBSTER [[id number]]                 No follow-up provider specified.       Medication List      No changes were made to your prescriptions during this visit.            Kasey Briones, APRN  01/15/25 6296

## 2025-01-15 NOTE — H&P
[facility] Medicine Services  History & Physical    Patient Name: Juvenal Vance  : 1990  MRN: [id number]  Primary Care Physician:  Provider, No Known  Date of admission: 1/15/2025  Date and Time of Service: 1/15/2025 at 1635    Subjective      Chief Complaint: Nausea vomiting    History of Present Illness: Juvenal Vance is a 34 y.o. male with a CMH of alcohol abuse who presented to [facility] on 1/15/2025 with intractable nausea and vomiting.  He also endorses abdominal pain localized to midsternal and epigastric region with radiation to back daily (as constant dull ache with intermittent sharp spasms.  Patient denies hematemesis, melena, hematochezia.  Patient does admit to alcohol use last night.  He states that he drinks 4 alcoholic beverages per day each with 1 shot of whiskey.  Patient reports that he has been drinking like this for the past 3 years.  He denies symptoms of withdrawal symptoms hallucinations, seizures, anxiety, diaphoresis when without alcohol use.  Denies previous diagnosis of liver disease.  Patient is interested in cessation of alcohol use.    On ED evaluation, patient was noted to be tachycardic with HR in the 110s.  Labs are pertinent for WBC 11.5, potassium 2.7, magnesium 1.6, anion gap 22.6, , ,  with a T. bili at 1.8.  Lipase was 707 UA with no evidence of infection.  EtOH was 0.014.  CT abdomen pelvis revealed acute interstitial edematous pancreatitis with no evidence of necrosis as well as hepatomegaly with severe hepatic steatosis as well as left upper quadrant varices.  Patient was given 1 L IV fluids.  Hospital service to admit for further management of acute pancreatitis.      Review of Systems   Constitutional:  Negative for chills, fatigue and fever.   Respiratory:  Negative for shortness of breath.    Cardiovascular:  Negative for chest pain and palpitations.   Gastrointestinal:  Positive for abdominal pain, nausea  and vomiting.   All other systems reviewed and are negative.      Personal History     No past medical history on file.    No past surgical history on file.    Family History: family history is not on file. Otherwise pertinent FHx was reviewed and not pertinent to current issue.    Social History:  reports that he has been smoking cigarettes. He started smoking about 6 years ago. He has a 3 pack-year smoking history. He does not have any smokeless tobacco history on file. He reports current alcohol use. He reports that he does not use drugs.    Home Medications:  Prior to Admission Medications       None              Allergies:  No Known Allergies    Objective      Vitals:   Temp:  [98 °F (36.7 °C)] 98 °F (36.7 °C)  Heart Rate:  [] 86  Resp:  [20-25] 20  BP: (136-145)/(71-95) 144/95  Body mass index is 31.32 kg/m².    Physical Exam  General: 35 yo WM, Alert and oriented, well nourished, no acute distress.  HENT: Normocephalic, normal hearing, moist oral mucosa, no scleral icterus.  Neck: Supple, nontender, no carotid bruits, no JVD, no LAD.  Lungs: Clear to auscultation, nonlabored respiration.  Heart: RRR, no murmur, gallop or edema.  Abdomen: Soft, moderate epigastric TTP, nondistended, + bowel sounds.  Musculoskeletal: Normal range of motion and strength, no tenderness or swelling.  Skin: Skin is warm, dry and pink, no rashes or lesions.  Psychiatric: Cooperative, appropriate mood and affect.      Diagnostic Data:  Lab Results (last 24 hours)       Procedure Component Value Units Date/Time    Lipase [[id number]]  (Abnormal) Collected: 01/15/25 1253    Specimen: Blood from Arm, Right Updated: 01/15/25 1330     Lipase 707 U/L     Comprehensive Metabolic Panel [[id number]]  (Abnormal) Collected: 01/15/25 1253    Specimen: Blood from Arm, Right Updated: 01/15/25 1319     Glucose 138 mg/dL      BUN 7 mg/dL      Creatinine 0.75 mg/dL      Sodium 137 mmol/L      Potassium 2.7 mmol/L      Comment: Slight hemolysis  detected by analyzer. Result may be falsely elevated.        Chloride 96 mmol/L      CO2 18.4 mmol/L      Calcium 9.1 mg/dL      Total Protein 7.2 g/dL      Albumin 4.2 g/dL      ALT (SGPT) 105 U/L      AST (SGOT) 177 U/L      Alkaline Phosphatase 232 U/L      Total Bilirubin 1.8 mg/dL      Globulin 3.0 gm/dL      A/G Ratio 1.4 g/dL      BUN/Creatinine Ratio 9.3     Anion Gap 22.6 mmol/L      eGFR 123.0 mL/min/1.73     Narrative:      GFR Categories in Chronic Kidney Disease (CKD)      GFR Category          GFR (mL/min/1.73)    Interpretation  G1                     90 or greater         Normal or high (1)  G2                      60-89                Mild decrease (1)  G3a                   45-59                Mild to moderate decrease  G3b                   30-44                Moderate to severe decrease  G4                    15-29                Severe decrease  G5                    14 or less           Kidney failure          (1)In the absence of evidence of kidney disease, neither GFR category G1 or G2 fulfill the criteria for CKD.    eGFR calculation 2021 CKD-EPI creatinine equation, which does not include race as a factor    Magnesium [[id number]]  (Normal) Collected: 01/15/25 1253    Specimen: Blood from Arm, Right Updated: 01/15/25 1319     Magnesium 1.6 mg/dL     Ethanol [[id number]] Collected: 01/15/25 1253    Specimen: Blood from Arm, Right Updated: 01/15/25 1317     Ethanol % 0.014 %     Narrative:      Plasma Ethanol Clinical Symptoms:    ETOH (%)               Clinical Symptom  .01-.05              No apparent influence  .03-.12              Euphoria, Diminished judgment and attention   .09-.25              Impaired comprehension, Muscle incoordination  .18-.30              Confusion, Staggered gait, Slurred speech  .25-.40              Markedly decreased response to stimuli, unable to stand or                        walk, vomitting, sleep or stupor  .35-.50              Comatose, Anesthesia,  Subnormal body temperature        CBC & Differential [[id number]]  (Abnormal) Collected: 01/15/25 1253    Specimen: Blood from Arm, Right Updated: 01/15/25 1259    Narrative:      The following orders were created for panel order CBC & Differential.  Procedure                               Abnormality         Status                     ---------                               -----------         ------                     CBC Auto Differential[[id number]]        Abnormal            Final result                 Please view results for these tests on the individual orders.    CBC Auto Differential [[id number]]  (Abnormal) Collected: 01/15/25 1253    Specimen: Blood from Arm, Right Updated: 01/15/25 1259     WBC 11.58 10*3/mm3      RBC 4.62 10*6/mm3      Hemoglobin 16.2 g/dL      Hematocrit 45.0 %      MCV 97.4 fL      MCH 35.1 pg      MCHC 36.0 g/dL      RDW 15.4 %      RDW-SD 55.2 fl      MPV 8.7 fL      Platelets 212 10*3/mm3      Neutrophil % 76.5 %      Lymphocyte % 18.0 %      Monocyte % 4.7 %      Eosinophil % 0.1 %      Basophil % 0.3 %      Immature Grans % 0.4 %      Neutrophils, Absolute 8.84 10*3/mm3      Lymphocytes, Absolute 2.09 10*3/mm3      Monocytes, Absolute 0.55 10*3/mm3      Eosinophils, Absolute 0.01 10*3/mm3      Basophils, Absolute 0.04 10*3/mm3      Immature Grans, Absolute 0.05 10*3/mm3      nRBC 0.0 /100 WBC              Imaging Results (Last 24 Hours)       Procedure Component Value Units Date/Time    CT Abdomen Pelvis With Contrast [[id number]] Collected: 01/15/25 1434     Updated: 01/15/25 1455    Narrative:      CT ABDOMEN PELVIS W CONTRAST    Date of Exam: 1/15/2025 2:25 PM EST    Indication: upper abd pain/vomiting/elevated LFTs.    Comparison: CT abdomen and pelvis dated 7/13/2024    Technique: Axial CT images were obtained of the abdomen and pelvis following the uneventful intravenous administration of iodinated contrast. Sagittal and coronal reconstructions were performed.  Automated  exposure control and iterative reconstruction   methods were used.        Findings:  The heart size is normal. There is no pericardial effusion. The lung bases are clear.    There is hepatomegaly with severe hepatic steatosis. The portal vein and hepatic veins are patent. The gallbladder is present without wall thickening. There is no intrahepatic or extrahepatic biliary ductal dilatation.    The spleen size is at the upper limit of normal measuring 13 cm. The adrenal glands appear within normal limits. There is abnormal appearance of the pancreas with acute peripancreatic edema predominantly surrounding the pancreatic head, neck, and body.   Findings likely reflect acute interstitial edematous pancreatitis. No evidence of pancreatic necrosis.    The kidneys are symmetric in size and enhancement. There are either small nonobstructing renal stones or early excretion of contrast at the calyces. There is no hydronephrosis. The urinary bladder is partially fluid-filled without wall thickening. The   prostate is normal in size.    There is a small sliding-type hiatal hernia. There are left upper quadrant varices and collaterals, some of which appear endoluminal within the gastric cardia. There are small foci of trapped gas between the gastric folds. This does not appear to   represent pneumatosis. The small bowel is normal in caliber without small bowel obstruction. The appendix is visualized and normal within the right lower quadrant. There is no evidence of acute colitis or diverticulitis. There is no free air.    The aorta is normal in caliber without evidence of aneurysm formation. There is inflammatory stranding which surrounds the patent SMV and splenic artery and vein. There is no abdominal or pelvic lymphadenopathy. There are no acute osseous findings.      Impression:      Impression:  1.Acute interstitial edematous pancreatitis. No evidence of pancreatic necrosis.  2.Hepatomegaly with severe hepatic  steatosis.  3.Left upper quadrant varices and collaterals, some of which appear endoluminal within the gastric cardia.   4.Small sliding-type hiatal hernia.        Electronically Signed: Wilmar Suarez    1/15/2025 2:51 PM EST    Workstation ID: [id number]              Assessment & Plan        This is a 34 y.o. male with:    Active and Resolved Problems  Active Hospital Problems    Diagnosis  POA    **Alcohol-induced pancreatitis [K85.20]  Yes      Resolved Hospital Problems   No resolved problems to display.       Alcohol induced pancreatitis  Anion gap acidosis  Leukocytosis  Transaminitis  Hypokalemia  Hypomagnesemia  CT abdomen pelvis with acute interstitial edematous pancreatitis with no evidence of pancreatic necrosis as well as hepatomegaly and severe hepatic steatosis with left upper quadrant varices and collaterals  Lipase 707  WBC 11.5, likely reactive  K 2.7, magnesium 1.6, replete electrolytes per protocol  , , , T. bili 1.8  CIWA protocol with scheduled Ativan  Seizure precautions  Multivitamin, Folic acid and Thiamine  N.p.o., advance diet as tolerated  Aggressive IV fluids  As needed analgesics and antiemetics  GI consulted      VTE Prophylaxis:  Mechanical VTE prophylaxis orders are present.        The patient desires to be as follows:    CODE STATUS:    Code Status (Patient has no pulse and is not breathing): CPR (Attempt to Resuscitate)  Medical Interventions (Patient has pulse or is breathing): Full Support        Chuckie Sherwood, who can be contacted at [phone], is the designated person to make medical decisions on the patient's behalf if He is incapable of doing so. This was clarified with patient and/or next of kin on 1/15/2025 during the course of this H&P.    Admission Status:  I believe this patient meets inpatient status.    Expected Length of Stay: 2 to 3 days    PDMP and Medication Dispenses via Sidebar reviewed and consistent with patient reported medications.    I  discussed the patient's findings and my recommendations with patient.      Signature:     This document has been electronically signed by Susana Akins PA-C on January 15, 2025 16:36 EST   [facility]

## 2025-01-16 ENCOUNTER — INPATIENT HOSPITAL (OUTPATIENT)
Dept: URBAN - METROPOLITAN AREA HOSPITAL 84 | Facility: HOSPITAL | Age: 35
End: 2025-01-16

## 2025-01-16 ENCOUNTER — APPOINTMENT (OUTPATIENT)
Dept: ULTRASOUND IMAGING | Facility: HOSPITAL | Age: 35
DRG: 439 | End: 2025-01-16

## 2025-01-16 DIAGNOSIS — R11.2 NAUSEA WITH VOMITING, UNSPECIFIED: ICD-10-CM

## 2025-01-16 LAB
ALBUMIN SERPL-MCNC: 3.4 G/DL (ref 3.5–5.2)
ALBUMIN/GLOB SERPL: 1.4 G/DL
ALP SERPL-CCNC: 182 U/L (ref 39–117)
ALT SERPL W P-5'-P-CCNC: 75 U/L (ref 1–41)
ANION GAP SERPL CALCULATED.3IONS-SCNC: 7.6 MMOL/L (ref 5–15)
ANISOCYTOSIS BLD QL: NORMAL
AST SERPL-CCNC: 121 U/L (ref 1–40)
BASOPHILS # BLD AUTO: 0.01 10*3/MM3 (ref 0–0.2)
BASOPHILS NFR BLD AUTO: 0.2 % (ref 0–1.5)
BILIRUB SERPL-MCNC: 2.5 MG/DL (ref 0–1.2)
BUN SERPL-MCNC: 6 MG/DL (ref 6–20)
BUN/CREAT SERPL: 9 (ref 7–25)
CALCIUM SPEC-SCNC: 8.3 MG/DL (ref 8.6–10.5)
CHLORIDE SERPL-SCNC: 105 MMOL/L (ref 98–107)
CO2 SERPL-SCNC: 24.4 MMOL/L (ref 22–29)
CREAT SERPL-MCNC: 0.67 MG/DL (ref 0.76–1.27)
CRP SERPL-MCNC: 2.03 MG/DL (ref 0–0.5)
DEPRECATED RDW RBC AUTO: 61.1 FL (ref 37–54)
EGFRCR SERPLBLD CKD-EPI 2021: 127.2 ML/MIN/1.73
EOSINOPHIL # BLD AUTO: 0.02 10*3/MM3 (ref 0–0.4)
EOSINOPHIL NFR BLD AUTO: 0.4 % (ref 0.3–6.2)
ERYTHROCYTE [DISTWIDTH] IN BLOOD BY AUTOMATED COUNT: 15.7 % (ref 12.3–15.4)
GIANT PLATELETS: NORMAL
GLOBULIN UR ELPH-MCNC: 2.4 GM/DL
GLUCOSE SERPL-MCNC: 142 MG/DL (ref 65–99)
HCT VFR BLD AUTO: 41.2 % (ref 37.5–51)
HGB BLD-MCNC: 13.9 G/DL (ref 13–17.7)
IMM GRANULOCYTES # BLD AUTO: 0.02 10*3/MM3 (ref 0–0.05)
IMM GRANULOCYTES NFR BLD AUTO: 0.4 % (ref 0–0.5)
INR PPP: 1.14 (ref 0.9–1.1)
LARGE PLATELETS: NORMAL
LYMPHOCYTES # BLD AUTO: 0.98 10*3/MM3 (ref 0.7–3.1)
LYMPHOCYTES NFR BLD AUTO: 17.3 % (ref 19.6–45.3)
MACROCYTES BLD QL SMEAR: NORMAL
MAGNESIUM SERPL-MCNC: 1.7 MG/DL (ref 1.6–2.6)
MCH RBC QN AUTO: 35.5 PG (ref 26.6–33)
MCHC RBC AUTO-ENTMCNC: 33.7 G/DL (ref 31.5–35.7)
MCV RBC AUTO: 105.4 FL (ref 79–97)
MONOCYTES # BLD AUTO: 0.29 10*3/MM3 (ref 0.1–0.9)
MONOCYTES NFR BLD AUTO: 5.1 % (ref 5–12)
NEUTROPHILS NFR BLD AUTO: 4.33 10*3/MM3 (ref 1.7–7)
NEUTROPHILS NFR BLD AUTO: 76.6 % (ref 42.7–76)
NRBC BLD AUTO-RTO: 0 /100 WBC (ref 0–0.2)
PLATELET # BLD AUTO: 95 10*3/MM3 (ref 140–450)
PMV BLD AUTO: 8.5 FL (ref 6–12)
POIKILOCYTOSIS BLD QL SMEAR: NORMAL
POTASSIUM SERPL-SCNC: 4.7 MMOL/L (ref 3.5–5.2)
PROT SERPL-MCNC: 5.8 G/DL (ref 6–8.5)
PROTHROMBIN TIME: 14.7 SECONDS (ref 11.7–14.2)
RBC # BLD AUTO: 3.91 10*6/MM3 (ref 4.14–5.8)
SMALL PLATELETS BLD QL SMEAR: NORMAL
SODIUM SERPL-SCNC: 137 MMOL/L (ref 136–145)
TRIGL SERPL-MCNC: 158 MG/DL (ref 0–150)
WBC MORPH BLD: NORMAL
WBC NRBC COR # BLD AUTO: 5.65 10*3/MM3 (ref 3.4–10.8)

## 2025-01-16 PROCEDURE — 85610 PROTHROMBIN TIME: CPT

## 2025-01-16 PROCEDURE — 25010000002 ONDANSETRON PER 1 MG

## 2025-01-16 PROCEDURE — 25010000002 POTASSIUM CHLORIDE 10 MEQ/100ML SOLUTION: Performed by: STUDENT IN AN ORGANIZED HEALTH CARE EDUCATION/TRAINING PROGRAM

## 2025-01-16 PROCEDURE — 83735 ASSAY OF MAGNESIUM: CPT

## 2025-01-16 PROCEDURE — 80053 COMPREHEN METABOLIC PANEL: CPT

## 2025-01-16 PROCEDURE — 85025 COMPLETE CBC W/AUTO DIFF WBC: CPT

## 2025-01-16 PROCEDURE — 99222 1ST HOSP IP/OBS MODERATE 55: CPT

## 2025-01-16 PROCEDURE — 25010000002 MORPHINE PER 10 MG

## 2025-01-16 PROCEDURE — 85007 BL SMEAR W/DIFF WBC COUNT: CPT

## 2025-01-16 PROCEDURE — 25810000003 SODIUM CHLORIDE 0.9 % SOLUTION

## 2025-01-16 PROCEDURE — 86140 C-REACTIVE PROTEIN: CPT

## 2025-01-16 PROCEDURE — 76705 ECHO EXAM OF ABDOMEN: CPT

## 2025-01-16 PROCEDURE — 84478 ASSAY OF TRIGLYCERIDES: CPT

## 2025-01-16 RX ADMIN — LORAZEPAM 2 MG: 1 TABLET ORAL at 11:32

## 2025-01-16 RX ADMIN — ONDANSETRON 4 MG: 2 INJECTION INTRAMUSCULAR; INTRAVENOUS at 00:48

## 2025-01-16 RX ADMIN — PANTOPRAZOLE SODIUM 40 MG: 40 INJECTION, POWDER, FOR SOLUTION INTRAVENOUS at 17:29

## 2025-01-16 RX ADMIN — MORPHINE SULFATE 2 MG: 2 INJECTION, SOLUTION INTRAMUSCULAR; INTRAVENOUS at 23:10

## 2025-01-16 RX ADMIN — MORPHINE SULFATE 2 MG: 2 INJECTION, SOLUTION INTRAMUSCULAR; INTRAVENOUS at 19:10

## 2025-01-16 RX ADMIN — POTASSIUM CHLORIDE 10 MEQ: 10 INJECTION, SOLUTION INTRAVENOUS at 00:48

## 2025-01-16 RX ADMIN — SODIUM CHLORIDE 150 ML/HR: 9 INJECTION, SOLUTION INTRAVENOUS at 09:31

## 2025-01-16 RX ADMIN — PANTOPRAZOLE SODIUM 40 MG: 40 INJECTION, POWDER, FOR SOLUTION INTRAVENOUS at 08:13

## 2025-01-16 RX ADMIN — MORPHINE SULFATE 2 MG: 2 INJECTION, SOLUTION INTRAMUSCULAR; INTRAVENOUS at 00:48

## 2025-01-16 RX ADMIN — MORPHINE SULFATE 2 MG: 2 INJECTION, SOLUTION INTRAMUSCULAR; INTRAVENOUS at 15:00

## 2025-01-16 RX ADMIN — MORPHINE SULFATE 2 MG: 2 INJECTION, SOLUTION INTRAMUSCULAR; INTRAVENOUS at 05:34

## 2025-01-16 RX ADMIN — MORPHINE SULFATE 2 MG: 2 INJECTION, SOLUTION INTRAMUSCULAR; INTRAVENOUS at 09:31

## 2025-01-16 RX ADMIN — LORAZEPAM 1 MG: 1 TABLET ORAL at 23:10

## 2025-01-16 RX ADMIN — HYDROCODONE BITARTRATE AND ACETAMINOPHEN 1 TABLET: 5; 325 TABLET ORAL at 11:43

## 2025-01-16 RX ADMIN — LORAZEPAM 1 MG: 1 TABLET ORAL at 17:31

## 2025-01-16 NOTE — CONSULTS
GI CONSULT  NOTE:    Referring Provider:  Dr. Nielson    Chief complaint: Nausea/vomiting, abdominal pain    Subjective .     History of present illness: Patient is a 34-year-old male with a past medical history of alcohol abuse and hospitalization secondary to alcohol induced pancreatitis.  He presented to the hospital with complaints of intractable nausea and vomiting and epigastric abdominal pain.  Patient states that he drinks 4-5 shots of whiskey daily.  Patient states that he had been celebrating at a birthday today party for a friend and drank 1/5 of liquor prior to his current symptoms.  Patient states that his pain is a 5 out of 10 which is improved from an 8 out of 10 on admission.  He has never been to rehab.  Never tried AA or craving reducing medications.  He is interested in cessation of alcohol use.  He has not had any vomiting today thus far.  No steatorrhea. He does smoke half pack of cigarettes daily.  No history of abdominal surgeries.  Patient states that he recently lost his job which has caused increased levels of stress, which he sinan with by drinking alcohol.    Endo History:  No Previous endoscopies    Past Medical History:  History reviewed. No pertinent past medical history.    Past Surgical History:  History reviewed. No pertinent surgical history.    Social History:  Social History     Tobacco Use    Smoking status: Every Day     Current packs/day: 0.50     Average packs/day: 0.5 packs/day for 6.0 years (3.0 ttl pk-yrs)     Types: Cigarettes     Start date: 2019   Vaping Use    Vaping status: Every Day   Substance Use Topics    Alcohol use: Yes    Drug use: Never       Family History:  History reviewed. No pertinent family history.    Medications:  No medications prior to admission.       Scheduled Meds:folic acid, 1 mg, Oral, Daily  LORazepam, 2 mg, Oral, Q6H   Followed by  LORazepam, 1 mg, Oral, Q6H   Followed by  [START ON 1/17/2025] LORazepam, 1 mg, Oral, Q12H   Followed by  [START  "ON 1/19/2025] LORazepam, 1 mg, Oral, Daily  pantoprazole, 40 mg, Intravenous, BID AC  thiamine, 100 mg, Oral, Daily      Continuous Infusions:sodium chloride, 150 mL/hr, Last Rate: 150 mL/hr (01/16/25 0931)      PRN Meds:.  senna-docusate sodium **AND** polyethylene glycol **AND** bisacodyl **AND** bisacodyl    Calcium Replacement - Follow Nurse / BPA Driven Protocol    HYDROcodone-acetaminophen    LORazepam **OR** LORazepam **OR** LORazepam **OR** LORazepam **OR** LORazepam **OR** LORazepam    Magnesium Standard Dose Replacement - Follow Nurse / BPA Driven Protocol    melatonin    Morphine **AND** naloxone    ondansetron    Phosphorus Replacement - Follow Nurse / BPA Driven Protocol    Potassium Replacement - Follow Nurse / BPA Driven Protocol    [COMPLETED] Insert Peripheral IV **AND** sodium chloride    ALLERGIES:  Patient has no known allergies.    ROS:  Review of Systems   Constitutional:  Negative for chills and fever.   HENT:  Negative for trouble swallowing.    Respiratory:  Negative for shortness of breath.    Cardiovascular:  Negative for chest pain.   Gastrointestinal:  Positive for abdominal pain and nausea. Negative for abdominal distention, blood in stool, diarrhea and vomiting.   Genitourinary:  Negative for flank pain.   Skin:  Negative for color change.   Neurological:  Negative for syncope.   Psychiatric/Behavioral:  Negative for agitation and confusion.        Objective resting comfortably in bed in no acute distress    Vital Signs:   Visit Vitals  /83 (BP Location: Left arm, Patient Position: Lying)   Pulse 71   Temp 98.2 °F (36.8 °C) (Oral)   Resp 18   Ht 170.2 cm (67\")   Wt 90.7 kg (200 lb)   SpO2 99%   BMI 31.32 kg/m²       Physical Exam:      General Appearance:    Awake and alert, in no acute distress   Head:    Normocephalic, without obvious abnormality, atraumatic   Eyes:            Conjunctivae normal, anicteric sclerae   Ears:    Ears appear intact with no abnormalities noted "   Throat:   No oral lesions, no thrush, oral mucosa moist       Lungs:     respirations regular, even and unlabored       Chest Wall:    No abnormalities observed   Abdomen:     Normal bowel sounds, soft, tender epigastrium, no rebound or guarding, nondistended, no hepatosplenomegaly   Rectal:     Deferred   Extremities:   Moves all extremities well,       Skin:   no jaundice               Results Review:   I reviewed the patient's labs and imaging.  CBC  Results from last 7 days   Lab Units 01/16/25  0207 01/15/25  1253   RBC 10*6/mm3 3.91* 4.62   WBC 10*3/mm3 5.65 11.58*   HEMOGLOBIN g/dL 13.9 16.2   PLATELETS 10*3/mm3 95* 212       CMP  Results from last 7 days   Lab Units 01/16/25  0207 01/15/25  1253   SODIUM mmol/L 137 137   POTASSIUM mmol/L 4.7 2.7*   CHLORIDE mmol/L 105 96*   CO2 mmol/L 24.4 18.4*   BUN mg/dL 6 7   CREATININE mg/dL 0.67* 0.75*   GLUCOSE mg/dL 142* 138*   ALBUMIN g/dL 3.4* 4.2   BILIRUBIN mg/dL 2.5* 1.8*   ALK PHOS U/L 182* 232*   AST (SGOT) U/L 121* 177*   ALT (SGPT) U/L 75* 105*       Amylase and Lipase  Results from last 7 days   Lab Units 01/15/25  1253   LIPASE U/L 707*       CRP         Imaging Results (Last 24 Hours)       Procedure Component Value Units Date/Time    CT Abdomen Pelvis With Contrast [[id number]] Collected: 01/15/25 1434     Updated: 01/15/25 1455    Narrative:      CT ABDOMEN PELVIS W CONTRAST    Date of Exam: 1/15/2025 2:25 PM EST    Indication: upper abd pain/vomiting/elevated LFTs.    Comparison: CT abdomen and pelvis dated 7/13/2024    Technique: Axial CT images were obtained of the abdomen and pelvis following the uneventful intravenous administration of iodinated contrast. Sagittal and coronal reconstructions were performed.  Automated exposure control and iterative reconstruction   methods were used.        Findings:  The heart size is normal. There is no pericardial effusion. The lung bases are clear.    There is hepatomegaly with severe hepatic steatosis. The  portal vein and hepatic veins are patent. The gallbladder is present without wall thickening. There is no intrahepatic or extrahepatic biliary ductal dilatation.    The spleen size is at the upper limit of normal measuring 13 cm. The adrenal glands appear within normal limits. There is abnormal appearance of the pancreas with acute peripancreatic edema predominantly surrounding the pancreatic head, neck, and body.   Findings likely reflect acute interstitial edematous pancreatitis. No evidence of pancreatic necrosis.    The kidneys are symmetric in size and enhancement. There are either small nonobstructing renal stones or early excretion of contrast at the calyces. There is no hydronephrosis. The urinary bladder is partially fluid-filled without wall thickening. The   prostate is normal in size.    There is a small sliding-type hiatal hernia. There are left upper quadrant varices and collaterals, some of which appear endoluminal within the gastric cardia. There are small foci of trapped gas between the gastric folds. This does not appear to   represent pneumatosis. The small bowel is normal in caliber without small bowel obstruction. The appendix is visualized and normal within the right lower quadrant. There is no evidence of acute colitis or diverticulitis. There is no free air.    The aorta is normal in caliber without evidence of aneurysm formation. There is inflammatory stranding which surrounds the patent SMV and splenic artery and vein. There is no abdominal or pelvic lymphadenopathy. There are no acute osseous findings.      Impression:      Impression:  1.Acute interstitial edematous pancreatitis. No evidence of pancreatic necrosis.  2.Hepatomegaly with severe hepatic steatosis.  3.Left upper quadrant varices and collaterals, some of which appear endoluminal within the gastric cardia.   4.Small sliding-type hiatal hernia.        Electronically Signed: Wilmar Suarez    1/15/2025 2:51 PM EST    Workstation  ID: [id number]              ASSESSMENT:  -Alcohol induced induced pancreatitis  -Alcohol abuse  -Electrolyte abnormalities  -Elevated liver enzymes and liver function test-improving  -Severe hepatic steatosis on imaging  -Left upper quadrant varices that appear to be within the gastric cardia  -Leukocytosis-resolved  -Depression/anxiety    34-year-old male patient with a history of alcohol abuse and alcoholic pancreatitis.  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  Patient states that he typically drinks 4-5 shots of whiskey daily but drank 1/5 of whiskey at birthday party.  His pain is improving on exam today and he has not vomited yet today.  He has not tried any methods of alcohol cessation including medications to this point, but he would like to quit drinking alcohol.    CT abdomen pelvis with contrast-acute interstitial edematous pancreatitis with no evidence of pancreatic necrosis.  Hepatomegaly with severe hepatic steatosis.  Left upper quadrant varices and collaterals some of which appear endoluminal within the gastric cardia    19.67, sodium 137, alk phos 182, albumin 3.4, , ALT 75, total bilirubin 2.5, lipase 707, white blood cell count 5.65, hemoglobin 13.9, platelets 95, ,     PLAN:  -Consult case management for alcohol cessation plan  -Recommend referral to therapist for discussion of alternative techniques to manage depression and anxiety  -Check CRP, triglycerides, INR  -Ok for Clear liquid diet   -Continue normal saline infusion  -Continue supplementation of folate and thiamine  -Antiemetics as needed  -Continue [facility] protocol  -Recommend outpatient follow-up and EGD to better characterize possible gastric varices  -GI will continue to follow.  Please call with any questions.      I discussed the patient's findings and my recommendations with  the patient.  Aaron Potter PA-C  01/16/25  10:34 EST

## 2025-01-16 NOTE — PROGRESS NOTES
[facility] MEDICINE SERVICE  DAILY PROGRESS NOTE    NAME: Juvenal Vance  : 1990  MRN: [id number]      LOS: 1 day     PROVIDER OF SERVICE: Jurgen Nielson MD    Chief Complaint: Alcohol-induced pancreatitis    Subjective:     Interval History:  History taken from: patient    Patient seen and examined at bedside this morning.  Still having some epigastric pain, states that he was at a friend's party and had a lot of alcohol        Review of Systems: Negative except as described above  Review of Systems    Objective:     Vital Signs  Temp:  [98 °F (36.7 °C)-98.6 °F (37 °C)] 98.2 °F (36.8 °C)  Heart Rate:  [] 71  Resp:  [16-25] 18  BP: (120-161)/() 120/83   Body mass index is 31.32 kg/m².    Physical Exam  Physical Exam  Constitutional:       Comments: NAD    Cardiovascular:      Comments:  RRR, S1 & S2   Pulmonary:      Comments:  Lungs CTA   Abdominal:      Comments:  ABD soft, NT            Diagnostic Data    Results from last 7 days   Lab Units 25  0207   WBC 10*3/mm3 5.65   HEMOGLOBIN g/dL 13.9   HEMATOCRIT % 41.2   PLATELETS 10*3/mm3 95*   GLUCOSE mg/dL 142*   CREATININE mg/dL 0.67*   BUN mg/dL 6   SODIUM mmol/L 137   POTASSIUM mmol/L 4.7   AST (SGOT) U/L 121*   ALT (SGPT) U/L 75*   ALK PHOS U/L 182*   BILIRUBIN mg/dL 2.5*   ANION GAP mmol/L 7.6       CT Abdomen Pelvis With Contrast    Result Date: 1/15/2025  Impression: 1.Acute interstitial edematous pancreatitis. No evidence of pancreatic necrosis. 2.Hepatomegaly with severe hepatic steatosis. 3.Left upper quadrant varices and collaterals, some of which appear endoluminal within the gastric cardia. 4.Small sliding-type hiatal hernia. Electronically Signed: Wilmar Suarez  1/15/2025 2:51 PM EST  Workstation ID: [id number]       I reviewed the patient's new clinical results.    Assessment/Plan:     Active and Resolved Problems  Active Hospital Problems    Diagnosis  POA    **Alcohol-induced pancreatitis [K85.20]   Yes      Resolved Hospital Problems   No resolved problems to display.       Alcohol induced pancreatitis  Anion gap acidosis  Leukocytosis, resolved  Transaminitis  Hypokalemia  Hypomagnesemia  CT abdomen pelvis with acute interstitial edematous pancreatitis with no evidence of pancreatic necrosis as well as hepatomegaly and severe hepatic steatosis with left upper quadrant varices and collaterals  Lipase 707, continue to trend  Right upper quadrant ultrasound ordered  CIWA protocol with scheduled Ativan  Seizure precautions  Multivitamin, Folic acid and Thiamine  advance diet as tolerated  Aggressive IV fluids  As needed analgesics and antiemetics  GI consulted    VTE Prophylaxis:  Mechanical VTE prophylaxis orders are present.             Disposition Planning:        Anticipated Date of Discharge: 1/19/2025         Time: 35 minutes     Code Status and Medical Interventions: CPR (Attempt to Resuscitate); Full Support   Ordered at: 01/15/25 1635     Code Status (Patient has no pulse and is not breathing):    CPR (Attempt to Resuscitate)     Medical Interventions (Patient has pulse or is breathing):    Full Support       Signature: Electronically signed by Jurgen Nielson MD, 01/16/25, 11:09 EST.  [facility] Hospitalist Team

## 2025-01-16 NOTE — CASE MANAGEMENT/SOCIAL WORK
Discharge Planning Assessment   [facility]     Patient Name: Juvenal Vance  MRN: [id number]  Today's Date: 1/16/2025    Admit Date: 1/15/2025    Plan: Routine home   Discharge Needs Assessment       Row Name 01/16/25 1156       Living Environment    People in Home significant other    Name(s) of People in Home Chuckie    Current Living Arrangements home    Potentially Unsafe Housing Conditions none    In the past 12 months has the electric, gas, oil, or water company threatened to shut off services in your home? No    Primary Care Provided by self    Provides Primary Care For no one    Family Caregiver if Needed significant other    Family Caregiver Names Chuckie    Quality of Family Relationships helpful;involved;supportive    Able to Return to Prior Arrangements yes       Resource/Environmental Concerns    Resource/Environmental Concerns none    Transportation Concerns none       Transportation Needs    In the past 12 months, has lack of transportation kept you from medical appointments or from getting medications? no    In the past 12 months, has lack of transportation kept you from meetings, work, or from getting things needed for daily living? No       Food Insecurity    Within the past 12 months, you worried that your food would run out before you got the money to buy more. Never true    Within the past 12 months, the food you bought just didn't last and you didn't have money to get more. Never true       Transition Planning    Patient/Family Anticipates Transition to home with family    Patient/Family Anticipated Services at Transition none    Transportation Anticipated family or friend will provide       Discharge Needs Assessment    Readmission Within the Last 30 Days no previous admission in last 30 days    Equipment Currently Used at Home none    Do you want help finding or keeping work or a job? I do not need or want help    Do you want help with school or training? For example, starting or completing  job training or getting a high school diploma, GED or equivalent No    Anticipated Changes Related to Illness none    Equipment Needed After Discharge none                   Discharge Plan       Row Name 01/16/25 1157       Plan    Plan Routine home    Patient/Family in Agreement with Plan yes    Plan Comments CM met with patient at bedside. Confirmed that patient had no PCP and no insurance. Patient would like PCP set up at [place]. Patient states he has been denied for medicaid due to being over income. He is employed but the employer does not offer benefits until after 1 year, he started in November. Meds to beds enrolled. Patient denies any difficulty affording medications. Patient not current with any therapies. Confirmed transportation at discharge will be a friend. DC Barriers: IV dilaudid and zofran, IVF, CIWA-1, monitoring labs, GI consult.                  Continued Care and Services - Admitted Since 1/15/2025    No active coordination exists for this encounter.       Expected Discharge Date and Time       Expected Discharge Date Expected Discharge Time    Jan 18, 2025            Demographic Summary       Row Name 01/16/25 1155       General Information    Admission Type inpatient    Arrived From emergency department    Referral Source admission list    Reason for Consult discharge planning    Preferred Language English       Contact Information    Permission Granted to Share Info With                    Functional Status       Row Name 01/16/25 1155       Functional Status    Usual Activity Tolerance good    Current Activity Tolerance good       Functional Status, IADL    Medications independent    Meal Preparation independent    Housekeeping independent    Laundry independent    Shopping independent    If for any reason you need help with day-to-day activities such as bathing, preparing meals, shopping, managing finances, etc., do you get the help you need? I get all the help I need        Employment/    Employment Status employed full-time               Nydia Craig RN      Office: [phone]

## 2025-01-16 NOTE — CASE MANAGEMENT/SOCIAL WORK
Social Work Assessment  [facility]     Patient Name: Juvenal Vance  MRN: [id number]  Today's Date: 1/16/2025    Admit Date: 1/15/2025     Discharge Plan       Row Name 01/16/25 1343       Plan    Plan Comments LSW consulted d/t “alcoholic rehab arrangements.” LSW met with patient at bedside, introduced self/role and reason for visit. Patient reports that over the course of the last year (directly related to stress, family dynamics, etc.), he has started drinking 4-5 shots of whiskey per day. Takes shot, then chases liquor. Patient states his last drink was 1/14 in the evening, reporting he was out celebrating for his friends 30th birthday and drank more than normal. The next morning (1/15), is when patient reports the nausea/vomiting/abdominal pain started. Patient states no prior treatment for alcohol use and historically declines having experienced withdrawal symptoms. Patient reports stopping alcohol use for roughly 3-4 months on his own. Discussed treatment options (IP vs. IOP vs. 1:1 counseling with AA attendance). Patient states that he is unable to do IP d/t working full-time, varying hours (employed at [employer]). Patient agreeable to 1:1 counseling. Discussed local options d/t lack of payor (over income for [facility]) and patient agreeable to [facility] for PCP and behavioral health. Disclosed we can assist with PCP arrangement and directed patient to complete intake paperwork on website (patient pulled site up and started it on his phone prior to LSW leaving).                  FLEX Guevara, LSW, [facility]  Lead   Phone: [phone]  Cell: [phone]  Fax: [phone]  [email or web address]

## 2025-01-17 ENCOUNTER — INPATIENT HOSPITAL (OUTPATIENT)
Dept: URBAN - METROPOLITAN AREA HOSPITAL 84 | Facility: HOSPITAL | Age: 35
End: 2025-01-17

## 2025-01-17 DIAGNOSIS — E87.8 OTHER DISORDERS OF ELECTROLYTE AND FLUID BALANCE, NOT ELSEWH: ICD-10-CM

## 2025-01-17 DIAGNOSIS — K85.20 ALCOHOL INDUCED ACUTE PANCREATITIS WITHOUT NECROSIS OR INFEC: ICD-10-CM

## 2025-01-17 DIAGNOSIS — D69.59 OTHER SECONDARY THROMBOCYTOPENIA: ICD-10-CM

## 2025-01-17 DIAGNOSIS — F10.20 ALCOHOL DEPENDENCE, UNCOMPLICATED: ICD-10-CM

## 2025-01-17 DIAGNOSIS — I86.4 GASTRIC VARICES: ICD-10-CM

## 2025-01-17 LAB
ALBUMIN SERPL-MCNC: 3.2 G/DL (ref 3.5–5.2)
ALBUMIN/GLOB SERPL: 1.4 G/DL
ALP SERPL-CCNC: 155 U/L (ref 39–117)
ALT SERPL W P-5'-P-CCNC: 60 U/L (ref 1–41)
ANION GAP SERPL CALCULATED.3IONS-SCNC: 8.8 MMOL/L (ref 5–15)
AST SERPL-CCNC: 110 U/L (ref 1–40)
BASOPHILS # BLD AUTO: 0.01 10*3/MM3 (ref 0–0.2)
BASOPHILS NFR BLD AUTO: 0.3 % (ref 0–1.5)
BILIRUB SERPL-MCNC: 2.8 MG/DL (ref 0–1.2)
BUN SERPL-MCNC: 5 MG/DL (ref 6–20)
BUN/CREAT SERPL: 7.8 (ref 7–25)
CALCIUM SPEC-SCNC: 8.2 MG/DL (ref 8.6–10.5)
CHLORIDE SERPL-SCNC: 105 MMOL/L (ref 98–107)
CO2 SERPL-SCNC: 23.2 MMOL/L (ref 22–29)
CREAT SERPL-MCNC: 0.64 MG/DL (ref 0.76–1.27)
CRP SERPL-MCNC: 8.2 MG/DL (ref 0–0.5)
DEPRECATED RDW RBC AUTO: 57.4 FL (ref 37–54)
EGFRCR SERPLBLD CKD-EPI 2021: 129 ML/MIN/1.73
EOSINOPHIL # BLD AUTO: 0.04 10*3/MM3 (ref 0–0.4)
EOSINOPHIL NFR BLD AUTO: 1.3 % (ref 0.3–6.2)
ERYTHROCYTE [DISTWIDTH] IN BLOOD BY AUTOMATED COUNT: 15 % (ref 12.3–15.4)
GLOBULIN UR ELPH-MCNC: 2.3 GM/DL
GLUCOSE SERPL-MCNC: 94 MG/DL (ref 65–99)
HCT VFR BLD AUTO: 36.7 % (ref 37.5–51)
HGB BLD-MCNC: 12.6 G/DL (ref 13–17.7)
IMM GRANULOCYTES # BLD AUTO: 0.01 10*3/MM3 (ref 0–0.05)
IMM GRANULOCYTES NFR BLD AUTO: 0.3 % (ref 0–0.5)
LIPASE SERPL-CCNC: 248 U/L (ref 13–60)
LYMPHOCYTES # BLD AUTO: 1.04 10*3/MM3 (ref 0.7–3.1)
LYMPHOCYTES NFR BLD AUTO: 33.8 % (ref 19.6–45.3)
MAGNESIUM SERPL-MCNC: 1.6 MG/DL (ref 1.6–2.6)
MCH RBC QN AUTO: 35.5 PG (ref 26.6–33)
MCHC RBC AUTO-ENTMCNC: 34.3 G/DL (ref 31.5–35.7)
MCV RBC AUTO: 103.4 FL (ref 79–97)
MONOCYTES # BLD AUTO: 0.13 10*3/MM3 (ref 0.1–0.9)
MONOCYTES NFR BLD AUTO: 4.2 % (ref 5–12)
NEUTROPHILS NFR BLD AUTO: 1.85 10*3/MM3 (ref 1.7–7)
NEUTROPHILS NFR BLD AUTO: 60.1 % (ref 42.7–76)
NRBC BLD AUTO-RTO: 0 /100 WBC (ref 0–0.2)
PLATELET # BLD AUTO: 81 10*3/MM3 (ref 140–450)
PMV BLD AUTO: 9.9 FL (ref 6–12)
POTASSIUM SERPL-SCNC: 4.4 MMOL/L (ref 3.5–5.2)
PROT SERPL-MCNC: 5.5 G/DL (ref 6–8.5)
RBC # BLD AUTO: 3.55 10*6/MM3 (ref 4.14–5.8)
SODIUM SERPL-SCNC: 137 MMOL/L (ref 136–145)
WBC NRBC COR # BLD AUTO: 3.08 10*3/MM3 (ref 3.4–10.8)

## 2025-01-17 PROCEDURE — 25010000002 MORPHINE PER 10 MG

## 2025-01-17 PROCEDURE — 85025 COMPLETE CBC W/AUTO DIFF WBC: CPT

## 2025-01-17 PROCEDURE — 86140 C-REACTIVE PROTEIN: CPT

## 2025-01-17 PROCEDURE — 83735 ASSAY OF MAGNESIUM: CPT

## 2025-01-17 PROCEDURE — 83690 ASSAY OF LIPASE: CPT

## 2025-01-17 PROCEDURE — 99232 SBSQ HOSP IP/OBS MODERATE 35: CPT

## 2025-01-17 PROCEDURE — 80053 COMPREHEN METABOLIC PANEL: CPT

## 2025-01-17 RX ADMIN — PANTOPRAZOLE SODIUM 40 MG: 40 INJECTION, POWDER, FOR SOLUTION INTRAVENOUS at 09:07

## 2025-01-17 RX ADMIN — FOLIC ACID 1 MG: 1 TABLET ORAL at 09:07

## 2025-01-17 RX ADMIN — MORPHINE SULFATE 2 MG: 2 INJECTION, SOLUTION INTRAMUSCULAR; INTRAVENOUS at 05:32

## 2025-01-17 RX ADMIN — HYDROCODONE BITARTRATE AND ACETAMINOPHEN 1 TABLET: 5; 325 TABLET ORAL at 22:25

## 2025-01-17 RX ADMIN — Medication 100 MG: at 09:07

## 2025-01-17 RX ADMIN — LORAZEPAM 1 MG: 1 TABLET ORAL at 11:07

## 2025-01-17 RX ADMIN — Medication 10 ML: at 09:08

## 2025-01-17 RX ADMIN — LORAZEPAM 1 MG: 1 TABLET ORAL at 20:01

## 2025-01-17 RX ADMIN — HYDROCODONE BITARTRATE AND ACETAMINOPHEN 1 TABLET: 5; 325 TABLET ORAL at 15:16

## 2025-01-17 RX ADMIN — PANTOPRAZOLE SODIUM 40 MG: 40 INJECTION, POWDER, FOR SOLUTION INTRAVENOUS at 17:36

## 2025-01-17 RX ADMIN — HYDROCODONE BITARTRATE AND ACETAMINOPHEN 1 TABLET: 5; 325 TABLET ORAL at 09:07

## 2025-01-17 RX ADMIN — LORAZEPAM 1 MG: 1 TABLET ORAL at 05:15

## 2025-01-17 RX ADMIN — MORPHINE SULFATE 2 MG: 2 INJECTION, SOLUTION INTRAMUSCULAR; INTRAVENOUS at 20:01

## 2025-01-17 NOTE — PROGRESS NOTES
[facility] MEDICINE SERVICE  DAILY PROGRESS NOTE    NAME: Juvenal Vance  : 1990  MRN: [id number]      LOS: 2 days     PROVIDER OF SERVICE: Jurgen Nielson MD    Chief Complaint: Alcohol-induced pancreatitis    Subjective:     Interval History:  History taken from: patient    Patient seen and examined at bedside this morning.  No acute complaints overnight.  He was able to tolerate clears yesterday and eating regular food for breakfast today.  Denies any pain at the moment.        Review of Systems: Negative except described above  Review of Systems    Objective:     Vital Signs  Temp:  [97.9 °F (36.6 °C)-98.7 °F (37.1 °C)] 98.7 °F (37.1 °C)  Heart Rate:  [71-96] 71  Resp:  [16-18] 18  BP: (112-144)/() 123/92   Body mass index is 31.32 kg/m².    Physical Exam  Physical Exam  Constitutional:       Comments: NAD    Cardiovascular:      Comments:  RRR, S1 & S2   Pulmonary:      Comments:  Lungs CTA   Abdominal:      Comments:  ABD soft, NT            Diagnostic Data    Results from last 7 days   Lab Units 25  0529   WBC 10*3/mm3 3.08*   HEMOGLOBIN g/dL 12.6*   HEMATOCRIT % 36.7*   PLATELETS 10*3/mm3 81*   GLUCOSE mg/dL 94   CREATININE mg/dL 0.64*   BUN mg/dL 5*   SODIUM mmol/L 137   POTASSIUM mmol/L 4.4   AST (SGOT) U/L 110*   ALT (SGPT) U/L 60*   ALK PHOS U/L 155*   BILIRUBIN mg/dL 2.8*   ANION GAP mmol/L 8.8       US Abdomen Limited    Result Date: 2025  1. Study somewhat limited secondary to body habitus and overlying bowel gas. 2. Diffuse increased echogenicity liver compatible with diffuse hepatocellular disease, hepatic steatosis. Please correlate with liver function tests. 3. Para gallbladder appears unremarkable. 4. Common bile duct is minimally prominent for age measuring 5 mm. Electronically Signed: Rolly Dueñas MD  2025 6:05 AM EST  Workstation ID: [id number]    CT Abdomen Pelvis With Contrast    Result Date: 1/15/2025  Impression: 1.Acute interstitial  edematous pancreatitis. No evidence of pancreatic necrosis. 2.Hepatomegaly with severe hepatic steatosis. 3.Left upper quadrant varices and collaterals, some of which appear endoluminal within the gastric cardia. 4.Small sliding-type hiatal hernia. Electronically Signed: Wilmar Suarez  1/15/2025 2:51 PM EST  Workstation ID: [id number]       I reviewed the patient's new clinical results.    Assessment/Plan:     Active and Resolved Problems  Active Hospital Problems    Diagnosis  POA    **Alcohol-induced pancreatitis [K85.20]  Yes      Resolved Hospital Problems   No resolved problems to display.       Alcohol induced pancreatitis  Anion gap acidosis  Leukocytosis, resolved  Transaminitis  Hypokalemia, Hypomagnesemia due to chronic alcohol abuse  CT abdomen pelvis with acute interstitial edematous pancreatitis with no evidence of pancreatic necrosis as well as hepatomegaly and severe hepatic steatosis with left upper quadrant varices and collaterals  Lipase 707, repeat lipase improved to 248  Triglyceride levels mildly elevated 158  Right upper quadrant ultrasound ordered showed diffuse increased echogenicity liver compatible with hepatic steatosis  [facility] protocol with scheduled Ativan, Ativan taper will complete on 1/19  Seizure precautions  Multivitamin, Folic acid and Thiamine  advance diet as tolerated  Aggressive IV fluids  As needed analgesics and antiemetics  GI consulted, recommend outpatient EGD for abdominal varices    VTE Prophylaxis:  Mechanical VTE prophylaxis orders are present.             Disposition Planning:        Anticipated Date of Discharge: 1/19/2025         Time: 35 minutes     Code Status and Medical Interventions: CPR (Attempt to Resuscitate); Full Support   Ordered at: 01/15/25 1635     Code Status (Patient has no pulse and is not breathing):    CPR (Attempt to Resuscitate)     Medical Interventions (Patient has pulse or is breathing):    Full Support       Signature: Electronically signed by  Jurgen Nielson MD, 01/17/25, 10:22 EST.  [facility] Hospitalist Team

## 2025-01-17 NOTE — PLAN OF CARE
Problem: Adult Inpatient Plan of Care  Goal: Plan of Care Review  Outcome: Progressing  Goal: Patient-Specific Goal (Individualized)  Outcome: Progressing  Goal: Absence of Hospital-Acquired Illness or Injury  Outcome: Progressing  Intervention: Identify and Manage Fall Risk  Description: Perform standard risk assessment on admission using a validated tool or comprehensive approach appropriate to the patient; reassess fall risk frequently, with change in status or transfer to another level of care.  Communicate risk to interprofessional healthcare team; ensure fall risk visible cue.  Determine need for increased observation, equipment and environmental modification, as well as use of supportive, nonskid footwear.  Adjust safety measures to individual needs and identified risk factors.  Reinforce the importance of active participation with fall risk prevention, safety, and physical activity with the patient and family.  Perform regular intentional rounding to assess need for position change, pain assessment and personal needs, including assistance with toileting.  Recent Flowsheet Documentation  Taken 1/17/2025 0609 by Belem Dang RN  Safety Promotion/Fall Prevention:   fall prevention program maintained   clutter free environment maintained   activity supervised   lighting adjusted   safety round/check completed   room organization consistent  Taken 1/17/2025 0400 by Belem Dang RN  Safety Promotion/Fall Prevention:   safety round/check completed   nonskid shoes/slippers when out of bed   fall prevention program maintained   clutter free environment maintained   activity supervised  Taken 1/17/2025 0200 by Belem Dang RN  Safety Promotion/Fall Prevention: safety round/check completed  Taken 1/17/2025 0005 by Belem Dang RN  Safety Promotion/Fall Prevention: safety round/check completed  Intervention: Prevent Skin Injury  Description: Perform a screening for skin injury risk, such as  pressure or moisture-associated skin damage on admission and at regular intervals throughout hospital stay.  Keep all areas of skin (especially folds) clean and dry.  Maintain adequate skin hydration.  Relieve and redistribute pressure and protect bony prominences and skin at risk for injury; implement measures based on patient-specific risk factors.  Match turning and repositioning schedule to clinical condition.  Encourage weight shift frequently; assist with reposition if unable to complete independently.  Float heels off bed; avoid pressure on the Achilles tendon.  Keep skin free from extended contact with medical devices.  Optimize nutrition and hydration.  Encourage functional activity and mobility, as early as tolerated.  Use aids (e.g., slide boards, mechanical lift) during transfer.  Recent Flowsheet Documentation  Taken 1/17/2025 0005 by Belem Dang RN  Body Position: position changed independently  Skin Protection: transparent dressing maintained  Taken 1/16/2025 1913 by Belem Dang RN  Skin Protection: transparent dressing maintained  Intervention: Prevent and Manage VTE (Venous Thromboembolism) Risk  Description: Assess for VTE (venous thromboembolism) risk.  Promote early mobilization; encourage both active and passive leg exercises, if unable to ambulate.  Initiate and maintain compression or other therapy, as indicated, based on identified risk in accordance with organizational protocol and provider order.  Recognize the patient's individual risk for bleeding before initiating pharmacologic thromboprophylaxis.  Recent Flowsheet Documentation  Taken 1/17/2025 0005 by Belem Dang RN  VTE Prevention/Management:   bilateral   SCDs (sequential compression devices) off   patient refused intervention  Taken 1/16/2025 1913 by Belem Dang RN  VTE Prevention/Management:   bilateral   SCDs (sequential compression devices) off   patient refused intervention  Intervention: Prevent  Infection  Description: Maintain skin and mucous membrane integrity; promote hand, oral and pulmonary hygiene.  Optimize fluid balance, nutrition, sleep and glycemic control to maximize infection resistance.  Identify potential sources of infection early to prevent or mitigate progression of infection (e.g., wound, lines, devices).  Evaluate ongoing need for invasive devices; remove promptly when no longer indicated.  Review vaccination status.  Recent Flowsheet Documentation  Taken 1/17/2025 0609 by Belem Dang RN  Infection Prevention: rest/sleep promoted  Taken 1/17/2025 0400 by Belem Dang RN  Infection Prevention:   rest/sleep promoted   hand hygiene promoted  Taken 1/17/2025 0200 by Belem Dang RN  Infection Prevention: rest/sleep promoted  Taken 1/17/2025 0005 by Belem Dang RN  Infection Prevention: rest/sleep promoted  Goal: Optimal Comfort and Wellbeing  Outcome: Progressing  Intervention: Monitor Pain and Promote Comfort  Description: Assess pain level, treatment efficacy and patient response at regular intervals using a consistent pain scale.  Consider the presence and impact of preexisting chronic pain.  Encourage patient and caregiver involvement in pain assessment, interventions and safety measures.  Promote activity; balance with sleep and rest to enhance healing.  Recent Flowsheet Documentation  Taken 1/17/2025 0532 by Belem Dang RN  Pain Management Interventions: pain medication given  Taken 1/16/2025 1910 by Belem Dang RN  Pain Management Interventions: pain medication given  Intervention: Provide Person-Centered Care  Description: Use a family-focused approach to care; encourage support system presence and participation.  Develop trust and rapport by proactively providing information, encouraging questions, addressing concerns and offering reassurance.  Acknowledge emotional response to hospitalization.  Recognize and utilize personal coping strategies and  strengths; develop goals via shared decision-making.  Honor spiritual and cultural preferences.  Recent Flowsheet Documentation  Taken 1/16/2025 1913 by Belem Dang, RN  Trust Relationship/Rapport:   care explained   choices provided  Goal: Readiness for Transition of Care  Outcome: Progressing   Goal Outcome Evaluation:

## 2025-01-17 NOTE — PROGRESS NOTES
LOS: 2 days   Patient Care Team:  Provider, No Known as PCP - General      Subjective     Interval History:     Subjective: Patient reports feeling much better, and sleeping well overnight.  Reports abdominal pain has decreased to 2/10 in severity.  Denies nausea or vomiting.  Reports tolerating clear liquid diet.  Reports he will be going to outpatient rehab following discharge.  No nausea, vomiting      ROS:   No chest pain, shortness of breath, or cough.  No nausea or vomiting.  + Abdominal pain       Medication Review:     Current Facility-Administered Medications:     sennosides-docusate (PERICOLACE) 8.6-50 MG per tablet 2 tablet, 2 tablet, Oral, BID PRN **AND** polyethylene glycol (MIRALAX) packet 17 g, 17 g, Oral, Daily PRN **AND** bisacodyl (DULCOLAX) EC tablet 5 mg, 5 mg, Oral, Daily PRN **AND** bisacodyl (DULCOLAX) suppository 10 mg, 10 mg, Rectal, Daily PRN, Susana Akins PA-C    Calcium Replacement - Follow Nurse / BPA Driven Protocol, , Not Applicable, PRN, Susana Akins PA-C    folic acid (FOLVITE) tablet 1 mg, 1 mg, Oral, Daily, Susana Akins PA-C, 1 mg at 25 0907    HYDROcodone-acetaminophen (NORCO) 5-325 MG per tablet 1 tablet, 1 tablet, Oral, Q6H PRN, Daniel Delatorre MD, 1 tablet at 25 09    LORazepam (ATIVAN) tablet 1 mg, 1 mg, Oral, Q1H PRN **OR** LORazepam (ATIVAN) injection 1 mg, 1 mg, Intravenous, Q1H PRN, 1 mg at 01/15/25 2035 **OR** LORazepam (ATIVAN) tablet 2 mg, 2 mg, Oral, Q1H PRN **OR** LORazepam (ATIVAN) injection 2 mg, 2 mg, Intravenous, Q1H PRN **OR** LORazepam (ATIVAN) injection 2 mg, 2 mg, Intravenous, Q15 Min PRN **OR** LORazepam (ATIVAN) injection 2 mg, 2 mg, Intramuscular, Q15 Min PRN, Susana Akins PA-C    [] LORazepam (ATIVAN) tablet 2 mg, 2 mg, Oral, Q6H, 2 mg at 25 1132 **FOLLOWED BY** LORazepam (ATIVAN) tablet 1 mg, 1 mg, Oral, Q6H, 1 mg at 25 0515 **FOLLOWED BY** LORazepam (ATIVAN) tablet 1 mg, 1 mg, Oral, Q12H **FOLLOWED  BY** [START ON 1/19/2025] LORazepam (ATIVAN) tablet 1 mg, 1 mg, Oral, Daily, Susana Akins PA-DECLAN    Magnesium Standard Dose Replacement - Follow Nurse / BPA Driven Protocol, , Not Applicable, PRN, Susana Akins PA-C    melatonin tablet 5 mg, 5 mg, Oral, Nightly PRN, Susana Akins PA-C    morphine injection 2 mg, 2 mg, Intravenous, Q4H PRN, 2 mg at 01/17/25 0532 **AND** naloxone (NARCAN) injection 0.4 mg, 0.4 mg, Intravenous, Q5 Min PRN, Susana Akins PA-C    ondansetron (ZOFRAN) injection 4 mg, 4 mg, Intravenous, Q6H PRN, Susana Akins PA-C, 4 mg at 01/16/25 0048    pantoprazole (PROTONIX) injection 40 mg, 40 mg, Intravenous, BID AC, Susana Akins PA-DECLAN, 40 mg at 01/17/25 0907    Phosphorus Replacement - Follow Nurse / BPA Driven Protocol, , Not Applicable, PRN, Susana Akins PA-C    Potassium Replacement - Follow Nurse / BPA Driven Protocol, , Not Applicable, PRN, Susana Akins PA-DECLAN    [COMPLETED] Insert Peripheral IV, , , Once **AND** sodium chloride 0.9 % flush 10 mL, 10 mL, Intravenous, PRN, Kasey Briones, APRN, 10 mL at 01/17/25 0908    thiamine (VITAMIN B-1) tablet 100 mg, 100 mg, Oral, Daily, Susana Akins PA-C, 100 mg at 01/17/25 0907      Objective     Vital Signs  Vitals:    01/16/25 2314 01/17/25 0332 01/17/25 0800 01/17/25 0905   BP: (!) 140/104 112/76  123/92   BP Location: Left arm Left arm  Left arm   Patient Position:  Lying  Lying   Pulse: 91 96  71   Resp: 18 18  18   Temp:  98.6 °F (37 °C) 98.7 °F (37.1 °C)    TempSrc: Oral Oral Oral    SpO2: 99% 97%  100%   Weight:       Height:           Physical Exam:     General Appearance:    Laying in bed, Awake and alert, in no acute distress   Head:    Normocephalic, without obvious abnormality   Eyes:          Conjunctivae normal, anicteric sclera   Throat:   No oral lesions, no thrush, oral mucosa moist       Lungs:     respirations regular, even and unlabored, room air   Abdomen:     Soft, mild generalized tenderness with  "palpation, no rebound or guarding, non-distended   Rectal:     Deferred   Extremities:   No edema, no cyanosis   Skin:   No bruising or rash, no jaundice        Results Review:    Results from last 7 days   Lab Units 01/17/25  0529 01/16/25  0207 01/15/25  1253   WBC 10*3/mm3 3.08* 5.65 11.58*   HEMOGLOBIN g/dL 12.6* 13.9 16.2   PLATELETS 10*3/mm3 81* 95* 212     Results from last 7 days   Lab Units 01/17/25  0529 01/16/25  1138 01/16/25  0207 01/15/25  1253   SODIUM mmol/L 137  --  137 137   POTASSIUM mmol/L 4.4  --  4.7 2.7*   CHLORIDE mmol/L 105  --  105 96*   CO2 mmol/L 23.2  --  24.4 18.4*   BUN mg/dL 5*  --  6 7   CREATININE mg/dL 0.64*  --  0.67* 0.75*   GLUCOSE mg/dL 94  --  142* 138*   ALBUMIN g/dL 3.2*  --  3.4* 4.2   BILIRUBIN mg/dL 2.8*  --  2.5* 1.8*   ALK PHOS U/L 155*  --  182* 232*   AST (SGOT) U/L 110*  --  121* 177*   ALT (SGPT) U/L 60*  --  75* 105*   INR   --  1.14*  --   --    MAGNESIUM mg/dL 1.6  --  1.7 1.6   LIPASE U/L 248*  --   --  707*   CRP mg/dL 8.20*  --  2.03*  --      Estimated Creatinine Clearance: 177.9 mL/min (A) (by C-G formula based on SCr of 0.64 mg/dL (L)).  No results found for: \"HGBA1C\"      Infection     UA    Results from last 7 days   Lab Units 01/15/25  2029   NITRITE UA  Negative     Microbiology Results (last 10 days)       ** No results found for the last 240 hours. **          Imaging Results (Last 72 Hours)       Procedure Component Value Units Date/Time    US Abdomen Limited [[id number]] Collected: 01/17/25 0600     Updated: 01/17/25 0610    Narrative:      US ABDOMEN LIMITED    Date of Exam: 1/16/2025 11:11 PM EST    Indication: Elevated LFTs.    Comparison: CTs from 1/15/2025 and prior    Technique: Grayscale and color Doppler ultrasound evaluation of the right upper quadrant was performed.        FINDINGS:    Liver: Visualized liver parenchyma is diffusely increased in echogenicity. Study is somewhat limited by body habitus with limited evaluation of the deep " portions of the liver. Visualized liver parenchyma demonstrates no focal lesion or intraparotid   biliary ductal dilation. The central portions of the hepatic and portal vein are patent and appear to demonstrate normal directional flow.    Biliary System: Gallbladder is unremarkable without evidence of pericholecystic fluid, wall thickening or stones. Negative sonographic Alcocer's sign.   Common bile duct is minimally prominent for age measurin mm    Right Kidney: The right kidney is grossly unremarkable without evidence of hydronephrosis.     Pancreas: Obscured by bowel gas    Other: No evidence of right upper quadrant ascites.         Impression:        1. Study somewhat limited secondary to body habitus and overlying bowel gas.    2. Diffuse increased echogenicity liver compatible with diffuse hepatocellular disease, hepatic steatosis. Please correlate with liver function tests.    3. Para gallbladder appears unremarkable.    4. Common bile duct is minimally prominent for age measuring 5 mm.    Electronically Signed: Rolly Dueñas MD    2025 6:05 AM EST    Workstation ID: [id number]    CT Abdomen Pelvis With Contrast [[id number]] Collected: 01/15/25 1434     Updated: 01/15/25 1455    Narrative:      CT ABDOMEN PELVIS W CONTRAST    Date of Exam: 1/15/2025 2:25 PM EST    Indication: upper abd pain/vomiting/elevated LFTs.    Comparison: CT abdomen and pelvis dated 2024    Technique: Axial CT images were obtained of the abdomen and pelvis following the uneventful intravenous administration of iodinated contrast. Sagittal and coronal reconstructions were performed.  Automated exposure control and iterative reconstruction   methods were used.        Findings:  The heart size is normal. There is no pericardial effusion. The lung bases are clear.    There is hepatomegaly with severe hepatic steatosis. The portal vein and hepatic veins are patent. The gallbladder is present without wall thickening. There is  no intrahepatic or extrahepatic biliary ductal dilatation.    The spleen size is at the upper limit of normal measuring 13 cm. The adrenal glands appear within normal limits. There is abnormal appearance of the pancreas with acute peripancreatic edema predominantly surrounding the pancreatic head, neck, and body.   Findings likely reflect acute interstitial edematous pancreatitis. No evidence of pancreatic necrosis.    The kidneys are symmetric in size and enhancement. There are either small nonobstructing renal stones or early excretion of contrast at the calyces. There is no hydronephrosis. The urinary bladder is partially fluid-filled without wall thickening. The   prostate is normal in size.    There is a small sliding-type hiatal hernia. There are left upper quadrant varices and collaterals, some of which appear endoluminal within the gastric cardia. There are small foci of trapped gas between the gastric folds. This does not appear to   represent pneumatosis. The small bowel is normal in caliber without small bowel obstruction. The appendix is visualized and normal within the right lower quadrant. There is no evidence of acute colitis or diverticulitis. There is no free air.    The aorta is normal in caliber without evidence of aneurysm formation. There is inflammatory stranding which surrounds the patent SMV and splenic artery and vein. There is no abdominal or pelvic lymphadenopathy. There are no acute osseous findings.      Impression:      Impression:  1.Acute interstitial edematous pancreatitis. No evidence of pancreatic necrosis.  2.Hepatomegaly with severe hepatic steatosis.  3.Left upper quadrant varices and collaterals, some of which appear endoluminal within the gastric cardia.   4.Small sliding-type hiatal hernia.        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  Patient states that he typically drinks 4-5 shots of whiskey daily but drank 1/5 of whiskey at birthday party.  His pain is improving on exam today and he has not vomited yet today.  He has not tried any methods of alcohol cessation including medications to this point, but he would like to quit drinking alcohol.    CT abdomen pelvis with contrast-acute interstitial edematous pancreatitis with no evidence of pancreatic necrosis.  Hepatomegaly with severe hepatic steatosis.  Left upper quadrant varices and collaterals some of which appear endoluminal within the gastric cardia    Alkaline phosphatase 155, , ALT 60, total bilirubin 2.8  CRP 8.20, lipase 248  WBC 3.08, hemoglobin 12.6, .4, platelets 81 MCV  Triglycerides 158  INR 1.14 on 1/16/2025    -Clinically patient doing fairly well patient improving clinically and tolerating diet.  Okay to discharge from GI standpoint if tolerating low-fat diet today.  -MELD 3.0 score was 14 and Madrey's discriminant function was 4.8 on 1/16/2025.  No recommendation for treatment with steroids at this time.  -Case management onboard for alcohol cessation plan to outpatient rehab post-hospital discharge  -Recommend referral to therapist for  discussion of alternative techniques to manage depression and anxiety  -Continue supplementation of folate and thiamine  -Antiemetics as needed  -Recommend outpatient follow-up and EGD to better characterize possible gastric varices  -Recommend complete alcohol cessation.  -Supportive care.      Electronically signed by PAU Richards, 01/17/25, 10:19 AM EST.

## 2025-01-17 NOTE — CASE MANAGEMENT/SOCIAL WORK
Continued Stay Note  [facility]     Patient Name: Juvenal Vance  MRN: [id number]  Today's Date: 1/17/2025    Admit Date: 1/15/2025    Plan: Routine home   Discharge Plan       Row Name 01/17/25 0920       Plan    Plan Comments DC Barriers: CIWA, scheduled Ativan, CT shows pancreatitis, Lipase 248, IVFs, GI following             Taryn Mccoy RN     [facility]  Office: [phone]  Cell: [phone]  Fax # [phone]

## 2025-01-18 ENCOUNTER — INPATIENT HOSPITAL (OUTPATIENT)
Dept: URBAN - METROPOLITAN AREA HOSPITAL 84 | Facility: HOSPITAL | Age: 35
End: 2025-01-18

## 2025-01-18 VITALS
HEART RATE: 75 BPM | TEMPERATURE: 97.8 F | HEIGHT: 67 IN | DIASTOLIC BLOOD PRESSURE: 97 MMHG | RESPIRATION RATE: 22 BRPM | WEIGHT: 200 LBS | BODY MASS INDEX: 31.39 KG/M2 | OXYGEN SATURATION: 96 % | SYSTOLIC BLOOD PRESSURE: 134 MMHG

## 2025-01-18 DIAGNOSIS — E87.8 OTHER DISORDERS OF ELECTROLYTE AND FLUID BALANCE, NOT ELSEWH: ICD-10-CM

## 2025-01-18 DIAGNOSIS — I86.4 GASTRIC VARICES: ICD-10-CM

## 2025-01-18 DIAGNOSIS — F10.20 ALCOHOL DEPENDENCE, UNCOMPLICATED: ICD-10-CM

## 2025-01-18 DIAGNOSIS — D69.59 OTHER SECONDARY THROMBOCYTOPENIA: ICD-10-CM

## 2025-01-18 DIAGNOSIS — K85.20 ALCOHOL INDUCED ACUTE PANCREATITIS WITHOUT NECROSIS OR INFEC: ICD-10-CM

## 2025-01-18 LAB
ALBUMIN SERPL-MCNC: 3.4 G/DL (ref 3.5–5.2)
ALBUMIN/GLOB SERPL: 1.5 G/DL
ALP SERPL-CCNC: 167 U/L (ref 39–117)
ALT SERPL W P-5'-P-CCNC: 58 U/L (ref 1–41)
ANION GAP SERPL CALCULATED.3IONS-SCNC: 7.4 MMOL/L (ref 5–15)
AST SERPL-CCNC: 93 U/L (ref 1–40)
BASOPHILS # BLD AUTO: 0.01 10*3/MM3 (ref 0–0.2)
BASOPHILS NFR BLD AUTO: 0.3 % (ref 0–1.5)
BILIRUB SERPL-MCNC: 1.6 MG/DL (ref 0–1.2)
BUN SERPL-MCNC: 5 MG/DL (ref 6–20)
BUN/CREAT SERPL: 7.5 (ref 7–25)
CALCIUM SPEC-SCNC: 8.4 MG/DL (ref 8.6–10.5)
CHLORIDE SERPL-SCNC: 103 MMOL/L (ref 98–107)
CO2 SERPL-SCNC: 26.6 MMOL/L (ref 22–29)
CREAT SERPL-MCNC: 0.67 MG/DL (ref 0.76–1.27)
CRP SERPL-MCNC: 6.44 MG/DL (ref 0–0.5)
DEPRECATED RDW RBC AUTO: 56.1 FL (ref 37–54)
EGFRCR SERPLBLD CKD-EPI 2021: 127.2 ML/MIN/1.73
EOSINOPHIL # BLD AUTO: 0.06 10*3/MM3 (ref 0–0.4)
EOSINOPHIL NFR BLD AUTO: 2.1 % (ref 0.3–6.2)
ERYTHROCYTE [DISTWIDTH] IN BLOOD BY AUTOMATED COUNT: 15 % (ref 12.3–15.4)
GLOBULIN UR ELPH-MCNC: 2.2 GM/DL
GLUCOSE SERPL-MCNC: 97 MG/DL (ref 65–99)
HCT VFR BLD AUTO: 34.5 % (ref 37.5–51)
HGB BLD-MCNC: 11.7 G/DL (ref 13–17.7)
IMM GRANULOCYTES # BLD AUTO: 0.01 10*3/MM3 (ref 0–0.05)
IMM GRANULOCYTES NFR BLD AUTO: 0.3 % (ref 0–0.5)
INR PPP: 1.11 (ref 0.9–1.1)
LIPASE SERPL-CCNC: 188 U/L (ref 13–60)
LYMPHOCYTES # BLD AUTO: 1.02 10*3/MM3 (ref 0.7–3.1)
LYMPHOCYTES NFR BLD AUTO: 35.7 % (ref 19.6–45.3)
MAGNESIUM SERPL-MCNC: 1.7 MG/DL (ref 1.6–2.6)
MCH RBC QN AUTO: 34.8 PG (ref 26.6–33)
MCHC RBC AUTO-ENTMCNC: 33.9 G/DL (ref 31.5–35.7)
MCV RBC AUTO: 102.7 FL (ref 79–97)
MONOCYTES # BLD AUTO: 0.12 10*3/MM3 (ref 0.1–0.9)
MONOCYTES NFR BLD AUTO: 4.2 % (ref 5–12)
NEUTROPHILS NFR BLD AUTO: 1.64 10*3/MM3 (ref 1.7–7)
NEUTROPHILS NFR BLD AUTO: 57.4 % (ref 42.7–76)
NRBC BLD AUTO-RTO: 0 /100 WBC (ref 0–0.2)
PLATELET # BLD AUTO: 67 10*3/MM3 (ref 140–450)
PMV BLD AUTO: 9 FL (ref 6–12)
POTASSIUM SERPL-SCNC: 4.1 MMOL/L (ref 3.5–5.2)
PROT SERPL-MCNC: 5.6 G/DL (ref 6–8.5)
PROTHROMBIN TIME: 14.4 SECONDS (ref 11.7–14.2)
RBC # BLD AUTO: 3.36 10*6/MM3 (ref 4.14–5.8)
SODIUM SERPL-SCNC: 137 MMOL/L (ref 136–145)
WBC NRBC COR # BLD AUTO: 2.86 10*3/MM3 (ref 3.4–10.8)

## 2025-01-18 PROCEDURE — 85025 COMPLETE CBC W/AUTO DIFF WBC: CPT

## 2025-01-18 PROCEDURE — 25010000002 MORPHINE PER 10 MG

## 2025-01-18 PROCEDURE — 80053 COMPREHEN METABOLIC PANEL: CPT

## 2025-01-18 PROCEDURE — 99232 SBSQ HOSP IP/OBS MODERATE 35: CPT | Performed by: NURSE PRACTITIONER

## 2025-01-18 PROCEDURE — 83690 ASSAY OF LIPASE: CPT

## 2025-01-18 PROCEDURE — 83735 ASSAY OF MAGNESIUM: CPT

## 2025-01-18 PROCEDURE — 86140 C-REACTIVE PROTEIN: CPT

## 2025-01-18 PROCEDURE — 85610 PROTHROMBIN TIME: CPT

## 2025-01-18 RX ORDER — FOLIC ACID 1 MG/1
1 TABLET ORAL DAILY
Qty: 30 TABLET | Refills: 0 | Status: SHIPPED | OUTPATIENT
Start: 2025-01-19 | End: 2025-02-19

## 2025-01-18 RX ORDER — LANOLIN ALCOHOL/MO/W.PET/CERES
100 CREAM (GRAM) TOPICAL DAILY
Qty: 30 TABLET | Refills: 0 | Status: SHIPPED | OUTPATIENT
Start: 2025-01-19 | End: 2025-02-19

## 2025-01-18 RX ADMIN — HYDROCODONE BITARTRATE AND ACETAMINOPHEN 1 TABLET: 5; 325 TABLET ORAL at 09:16

## 2025-01-18 RX ADMIN — Medication 100 MG: at 09:16

## 2025-01-18 RX ADMIN — MORPHINE SULFATE 2 MG: 2 INJECTION, SOLUTION INTRAMUSCULAR; INTRAVENOUS at 01:50

## 2025-01-18 RX ADMIN — PANTOPRAZOLE SODIUM 40 MG: 40 INJECTION, POWDER, FOR SOLUTION INTRAVENOUS at 09:16

## 2025-01-18 RX ADMIN — FOLIC ACID 1 MG: 1 TABLET ORAL at 09:16

## 2025-01-18 RX ADMIN — LORAZEPAM 1 MG: 1 TABLET ORAL at 09:16

## 2025-01-18 RX ADMIN — MORPHINE SULFATE 2 MG: 2 INJECTION, SOLUTION INTRAMUSCULAR; INTRAVENOUS at 11:59

## 2025-01-18 NOTE — PLAN OF CARE
Goal Outcome Evaluation:               Patient requesting to be discharged from MD. Patient wstated his sister was in a car accident and he needs to go and drive to [place] to see her. Patient stated that his pain is undercontrol and he feels better. IV side removed and patient is aware to follow up with PCP in 1 week.

## 2025-01-18 NOTE — PLAN OF CARE
Goal Outcome Evaluation:patient is a/ox4, able to make needs known to staff. No new needs at this time. He still requires IV pain medication for pain.

## 2025-01-18 NOTE — DISCHARGE SUMMARY
[facility] Medicine Services  Discharge Summary    Date of Service: 2025  Patient Name: Juvenal Vance  : 1990  MRN: [id number]    Date of Admission: 1/15/2025  Discharge Diagnosis:     Alcohol-induced pancreatitis  Alcohol abuse      Date of Discharge: 2025  Primary Care Physician: Provider, No Known        Hospital Course       Hospital Course:    Patient is a 34-year-old gentleman who was admitted and treated for acute alcoholic pancreatitis.  He had a lipase of 707 upon presentation.  CT scan of the abdomen done showed acute interstitial edematous pancreatitis. No evidence of pancreatic necrosis.  He was made n.p.o., started on aggressive IV fluid and treated with as needed pain medications.  He gradually improved.  He was comanaged by gastroenterology.  He also had elevated liver enzymes gradually improved.  He was started on clear liquid diet which he tolerated and this was advanced to a GI fat restricted diet which he tolerated.  Patient insisted on being discharged home today saying he had a family emergency.  I have discharged him.  Of note is that he was on CIWA protocol with as needed benzodiazepine for alcohol withdrawal symptoms as well as thiamine and folic acid.  He has no signs of alcohol withdrawal at this time.        DISCHARGE Follow Up Recommendations:-Follow-up PCP in 1 week, follow-up with gastroenterology in 1 week.      Day of Discharge     Vital Signs:  Temp:  [97.8 °F (36.6 °C)-98.4 °F (36.9 °C)] 97.8 °F (36.6 °C)  Heart Rate:  [75-93] 75  Resp:  [18-22] 22  BP: (128-145)/() 134/97    Physical Exam:  Physical Exam  Constitutional:       Appearance: Normal appearance.   HENT:      Head: Normocephalic and atraumatic.      Nose: Nose normal.      Mouth/Throat:      Mouth: Mucous membranes are moist.   Eyes:      Extraocular Movements: Extraocular movements intact.      Conjunctiva/sclera: Conjunctivae normal.      Pupils: Pupils are equal, round,  and reactive to light.   Cardiovascular:      Rate and Rhythm: Normal rate and regular rhythm.      Pulses: Normal pulses.      Heart sounds: Normal heart sounds.   Pulmonary:      Effort: Pulmonary effort is normal.      Breath sounds: Normal breath sounds.   Abdominal:      General: Abdomen is flat. Bowel sounds are normal.      Palpations: Abdomen is soft.   Musculoskeletal:         General: Normal range of motion.      Cervical back: Normal range of motion and neck supple.   Skin:     General: Skin is warm and dry.   Neurological:      General: No focal deficit present.      Mental Status: He is alert and oriented to person, place, and time.   Psychiatric:         Mood and Affect: Mood normal.         Behavior: Behavior normal.         Thought Content: Thought content normal.         Judgment: Judgment normal.           Pertinent  and/or Most Recent Results     LAB RESULTS:      Lab 01/18/25  0143 01/17/25  0529 01/16/25  1138 01/16/25  0207 01/15/25  1253   WBC 2.86* 3.08*  --  5.65 11.58*   HEMOGLOBIN 11.7* 12.6*  --  13.9 16.2   HEMATOCRIT 34.5* 36.7*  --  41.2 45.0   PLATELETS 67* 81*  --  95* 212   NEUTROS ABS 1.64* 1.85  --  4.33 8.84*   IMMATURE GRANS (ABS) 0.01 0.01  --  0.02 0.05   LYMPHS ABS 1.02 1.04  --  0.98 2.09   MONOS ABS 0.12 0.13  --  0.29 0.55   EOS ABS 0.06 0.04  --  0.02 0.01   .7* 103.4*  --  105.4* 97.4*   CRP 6.44* 8.20*  --  2.03*  --    PROTIME 14.4*  --  14.7*  --   --          Lab 01/18/25  0143 01/17/25  0529 01/16/25  0207 01/15/25  1253   SODIUM 137 137 137 137   POTASSIUM 4.1 4.4 4.7 2.7*   CHLORIDE 103 105 105 96*   CO2 26.6 23.2 24.4 18.4*   ANION GAP 7.4 8.8 7.6 22.6*   BUN 5* 5* 6 7   CREATININE 0.67* 0.64* 0.67* 0.75*   EGFR 127.2 129.0 127.2 123.0   GLUCOSE 97 94 142* 138*   CALCIUM 8.4* 8.2* 8.3* 9.1   MAGNESIUM 1.7 1.6 1.7 1.6         Lab 01/18/25  0143 01/17/25  0529 01/16/25  0207 01/15/25  1253   TOTAL PROTEIN 5.6* 5.5* 5.8* 7.2   ALBUMIN 3.4* 3.2* 3.4* 4.2    GLOBULIN 2.2 2.3 2.4 3.0   ALT (SGPT) 58* 60* 75* 105*   AST (SGOT) 93* 110* 121* 177*   BILIRUBIN 1.6* 2.8* 2.5* 1.8*   ALK PHOS 167* 155* 182* 232*   LIPASE 188* 248*  --  707*         Lab 01/18/25  0143 01/16/25  1138   PROTIME 14.4* 14.7*   INR 1.11* 1.14*         Lab 01/16/25  0207   TRIGLYCERIDES 158*             Brief Urine Lab Results  (Last result in the past 365 days)        Color   Clarity   Blood   Leuk Est   Nitrite   Protein   CREAT   Urine HCG        01/15/25 2029 Dark Yellow   Clear   Negative   Negative   Negative   Trace                 Microbiology Results (last 10 days)       ** No results found for the last 240 hours. **            US Abdomen Limited    Result Date: 1/17/2025  Impression: 1. Study somewhat limited secondary to body habitus and overlying bowel gas. 2. Diffuse increased echogenicity liver compatible with diffuse hepatocellular disease, hepatic steatosis. Please correlate with liver function tests. 3. Para gallbladder appears unremarkable. 4. Common bile duct is minimally prominent for age measuring 5 mm. Electronically Signed: Rolly Dueñas MD  1/17/2025 6:05 AM EST  Workstation ID: [id number]    CT Abdomen Pelvis With Contrast    Result Date: 1/15/2025  Impression: Impression: 1.Acute interstitial edematous pancreatitis. No evidence of pancreatic necrosis. 2.Hepatomegaly with severe hepatic steatosis. 3.Left upper quadrant varices and collaterals, some of which appear endoluminal within the gastric cardia. 4.Small sliding-type hiatal hernia. Electronically Signed: Wilmar Suarez  1/15/2025 2:51 PM EST  Workstation ID: [id number]                 Labs Pending at Discharge:  Pending Results       None            Procedures Performed           Consults:   Consults       Date and Time Order Name Status Description    1/15/2025  6:18 PM Inpatient Gastroenterology Consult Completed               Discharge Details        Discharge Medications        New Medications        Instructions  Start Date   folic acid 1 MG tablet  Commonly known as: FOLVITE   1 mg, Oral, Daily   Start Date: January 19, 2025     thiamine 100 MG tablet  Commonly known as: VITAMIN B1   100 mg, Oral, Daily   Start Date: January 19, 2025              No Known Allergies      Discharge Disposition: Home  Home or Self Care    Diet: GI fat restricted diet          Discharge Activity:   Activity Instructions       Activity as Tolerated                CODE STATUS:  Code Status and Medical Interventions: CPR (Attempt to Resuscitate); Full Support   Ordered at: 01/15/25 0095     Code Status (Patient has no pulse and is not breathing):    CPR (Attempt to Resuscitate)     Medical Interventions (Patient has pulse or is breathing):    Full Support         No future appointments.    Additional Instructions for the Follow-ups that You Need to Schedule       Discharge Follow-up with PCP   As directed       Currently Documented PCP:    Provider, No Known    PCP Phone Number:    None     Follow Up Details: 1 week        Discharge Follow-up with Specified Provider: GI; 1 Week   As directed      To: GI   Follow Up: 1 Week                Time spent on Discharge including face to face service: Greater than 30 minutes    Signature: Electronically signed by Esha Lovell MD, 01/18/25, 18:51 EST.  [facility]ist Team

## 2025-01-18 NOTE — PROGRESS NOTES
LOS: 3 days   Patient Care Team:  Provider, No Known as PCP - General      Subjective   Pain is not bad     Interval History:   LABS: Sodium and potassium are normal.  Creatinine 0.67.  TB 1.6 (2.8), alk phos 167 (155), AST 93 (110), ALT 58 (60).  CRP 6.44 down from 8.2 yesterday, lipase 198 (248).  INR 1.13.  WBCs 10.86, hemoglobin 11.7, platelets 67.      ROS:   No chest pain, shortness of breath, or cough.  No nausea or vomiting.  + Abdominal pain       Medication Review:     Current Facility-Administered Medications:     sennosides-docusate (PERICOLACE) 8.6-50 MG per tablet 2 tablet, 2 tablet, Oral, BID PRN **AND** polyethylene glycol (MIRALAX) packet 17 g, 17 g, Oral, Daily PRN **AND** bisacodyl (DULCOLAX) EC tablet 5 mg, 5 mg, Oral, Daily PRN **AND** bisacodyl (DULCOLAX) suppository 10 mg, 10 mg, Rectal, Daily PRN, Susana Akins PA-C    Calcium Replacement - Follow Nurse / BPA Driven Protocol, , Not Applicable, PRN, Susana Akins PA-C    folic acid (FOLVITE) tablet 1 mg, 1 mg, Oral, Daily, Susana Akins PA-C, 1 mg at 25 0916    HYDROcodone-acetaminophen (NORCO) 5-325 MG per tablet 1 tablet, 1 tablet, Oral, Q6H PRN, Daniel Delatorre MD, 1 tablet at 25 0916    LORazepam (ATIVAN) tablet 1 mg, 1 mg, Oral, Q1H PRN **OR** LORazepam (ATIVAN) injection 1 mg, 1 mg, Intravenous, Q1H PRN, 1 mg at 01/15/25 2035 **OR** LORazepam (ATIVAN) tablet 2 mg, 2 mg, Oral, Q1H PRN **OR** LORazepam (ATIVAN) injection 2 mg, 2 mg, Intravenous, Q1H PRN **OR** LORazepam (ATIVAN) injection 2 mg, 2 mg, Intravenous, Q15 Min PRN **OR** LORazepam (ATIVAN) injection 2 mg, 2 mg, Intramuscular, Q15 Min PRN, Susana Akins PA-C    [] LORazepam (ATIVAN) tablet 2 mg, 2 mg, Oral, Q6H, 2 mg at 25 1132 **FOLLOWED BY** [COMPLETED] LORazepam (ATIVAN) tablet 1 mg, 1 mg, Oral, Q6H, 1 mg at 25 1107 **FOLLOWED BY** [COMPLETED] LORazepam (ATIVAN) tablet 1 mg, 1 mg, Oral, Q12H, 1 mg at 25 0916 **FOLLOWED BY**  LORazepam (ATIVAN) tablet 1 mg, 1 mg, Oral, Daily, Jurgen Nielson MD    Magnesium Standard Dose Replacement - Follow Nurse / BPA Driven Protocol, , Not Applicable, PRN, Susana Akins PA-C    melatonin tablet 5 mg, 5 mg, Oral, Nightly PRN, Susana Akins PA-C    morphine injection 2 mg, 2 mg, Intravenous, Q4H PRN, 2 mg at 01/18/25 1159 **AND** naloxone (NARCAN) injection 0.4 mg, 0.4 mg, Intravenous, Q5 Min PRN, Susana Akins PA-C    ondansetron (ZOFRAN) injection 4 mg, 4 mg, Intravenous, Q6H PRN, Susana Akins PA-C, 4 mg at 01/16/25 0048    pantoprazole (PROTONIX) injection 40 mg, 40 mg, Intravenous, BID AC, Susana Akins PA-C, 40 mg at 01/18/25 0916    Phosphorus Replacement - Follow Nurse / BPA Driven Protocol, , Not Applicable, PRN, Susana Akins PA-C    Potassium Replacement - Follow Nurse / BPA Driven Protocol, , Not Applicable, PRN, Susana Akins PA-C    [COMPLETED] Insert Peripheral IV, , , Once **AND** sodium chloride 0.9 % flush 10 mL, 10 mL, Intravenous, PRN, Kasey Briones, APRN, 10 mL at 01/17/25 0908    thiamine (VITAMIN B-1) tablet 100 mg, 100 mg, Oral, Daily, Susana Akins PA-C, 100 mg at 01/18/25 0916      Objective sitting up on side of the bed dressed.  NAD.    Vital Signs  Vitals:    01/18/25 0137 01/18/25 0700 01/18/25 1046 01/18/25 1100   BP: 139/100 (!) 145/110 128/94 134/97   BP Location: Left arm Left arm  Left arm   Patient Position: Lying Lying  Lying   Pulse: 90 93 79 75   Resp: 20 18  22   Temp: 98.4 °F (36.9 °C) 98 °F (36.7 °C)  97.8 °F (36.6 °C)   TempSrc: Oral Oral  Oral   SpO2: 99% 96% 96% 96%   Weight:       Height:           Physical Exam:   General Appearance:    awake and alert, in no acute distress   Head:    Normocephalic, without obvious abnormality   Eyes:          Conjunctivae normal, anicteric sclera   Throat:   No oral lesions, no thrush, oral mucosa moist       Lungs:     respirations regular, even and unlabored, room air   Abdomen:      "Soft, mild generalized tenderness with palpation, no rebound or guarding, non-distended   Rectal:     Deferred   Extremities:   No edema, no cyanosis   Skin:   No bruising or rash, no jaundice        Results Review:    Results from last 7 days   Lab Units 01/18/25  0143 01/17/25  0529 01/16/25  0207 01/15/25  1253   WBC 10*3/mm3 2.86* 3.08* 5.65 11.58*   HEMOGLOBIN g/dL 11.7* 12.6* 13.9 16.2   PLATELETS 10*3/mm3 67* 81* 95* 212     Results from last 7 days   Lab Units 01/18/25  0143 01/17/25  0529 01/16/25  1138 01/16/25  0207 01/15/25  1253   SODIUM mmol/L 137 137  --  137 137   POTASSIUM mmol/L 4.1 4.4  --  4.7 2.7*   CHLORIDE mmol/L 103 105  --  105 96*   CO2 mmol/L 26.6 23.2  --  24.4 18.4*   BUN mg/dL 5* 5*  --  6 7   CREATININE mg/dL 0.67* 0.64*  --  0.67* 0.75*   GLUCOSE mg/dL 97 94  --  142* 138*   ALBUMIN g/dL 3.4* 3.2*  --  3.4* 4.2   BILIRUBIN mg/dL 1.6* 2.8*  --  2.5* 1.8*   ALK PHOS U/L 167* 155*  --  182* 232*   AST (SGOT) U/L 93* 110*  --  121* 177*   ALT (SGPT) U/L 58* 60*  --  75* 105*   INR  1.11*  --  1.14*  --   --    MAGNESIUM mg/dL 1.7 1.6  --  1.7 1.6   LIPASE U/L 188* 248*  --   --  707*   CRP mg/dL 6.44* 8.20*  --  2.03*  --      Estimated Creatinine Clearance: 169.9 mL/min (A) (by C-G formula based on SCr of 0.67 mg/dL (L)).  No results found for: \"HGBA1C\"      Infection     UA    Results from last 7 days   Lab Units 01/15/25  2029   NITRITE UA  Negative     Microbiology Results (last 10 days)       ** No results found for the last 240 hours. **          Imaging Results (Last 72 Hours)       Procedure Component Value Units Date/Time    US Abdomen Limited [[id number]] Collected: 01/17/25 0600     Updated: 01/17/25 0610    Narrative:      US ABDOMEN LIMITED    Date of Exam: 1/16/2025 11:11 PM EST    Indication: Elevated LFTs.    Comparison: CTs from 1/15/2025 and prior    Technique: Grayscale and color Doppler ultrasound evaluation of the right upper quadrant was " performed.        FINDINGS:    Liver: Visualized liver parenchyma is diffusely increased in echogenicity. Study is somewhat limited by body habitus with limited evaluation of the deep portions of the liver. Visualized liver parenchyma demonstrates no focal lesion or intraparotid   biliary ductal dilation. The central portions of the hepatic and portal vein are patent and appear to demonstrate normal directional flow.    Biliary System: Gallbladder is unremarkable without evidence of pericholecystic fluid, wall thickening or stones. Negative sonographic Alcocer's sign.   Common bile duct is minimally prominent for age measurin mm    Right Kidney: The right kidney is grossly unremarkable without evidence of hydronephrosis.     Pancreas: Obscured by bowel gas    Other: No evidence of right upper quadrant ascites.         Impression:        1. Study somewhat limited secondary to body habitus and overlying bowel gas.    2. Diffuse increased echogenicity liver compatible with diffuse hepatocellular disease, hepatic steatosis. Please correlate with liver function tests.    3. Para gallbladder appears unremarkable.    4. Common bile duct is minimally prominent for age measuring 5 mm.    Electronically Signed: Rolly Dueñas MD    2025 6:05 AM EST    Workstation ID: [id number]    CT Abdomen Pelvis With Contrast [[id number]] Collected: 01/15/25 1434     Updated: 01/15/25 1455    Narrative:      CT ABDOMEN PELVIS W CONTRAST    Date of Exam: 1/15/2025 2:25 PM EST    Indication: upper abd pain/vomiting/elevated LFTs.    Comparison: CT abdomen and pelvis dated 2024    Technique: Axial CT images were obtained of the abdomen and pelvis following the uneventful intravenous administration of iodinated contrast. Sagittal and coronal reconstructions were performed.  Automated exposure control and iterative reconstruction   methods were used.        Findings:  The heart size is normal. There is no pericardial effusion. The  lung bases are clear.    There is hepatomegaly with severe hepatic steatosis. The portal vein and hepatic veins are patent. The gallbladder is present without wall thickening. There is no intrahepatic or extrahepatic biliary ductal dilatation.    The spleen size is at the upper limit of normal measuring 13 cm. The adrenal glands appear within normal limits. There is abnormal appearance of the pancreas with acute peripancreatic edema predominantly surrounding the pancreatic head, neck, and body.   Findings likely reflect acute interstitial edematous pancreatitis. No evidence of pancreatic necrosis.    The kidneys are symmetric in size and enhancement. There are either small nonobstructing renal stones or early excretion of contrast at the calyces. There is no hydronephrosis. The urinary bladder is partially fluid-filled without wall thickening. The   prostate is normal in size.    There is a small sliding-type hiatal hernia. There are left upper quadrant varices and collaterals, some of which appear endoluminal within the gastric cardia. There are small foci of trapped gas between the gastric folds. This does not appear to   represent pneumatosis. The small bowel is normal in caliber without small bowel obstruction. The appendix is visualized and normal within the right lower quadrant. There is no evidence of acute colitis or diverticulitis. There is no free air.    The aorta is normal in caliber without evidence of aneurysm formation. There is inflammatory stranding which surrounds the patent SMV and splenic artery and vein. There is no abdominal or pelvic lymphadenopathy. There are no acute osseous findings.      Impression:      Impression:  1.Acute interstitial edematous pancreatitis. No evidence of pancreatic necrosis.  2.Hepatomegaly with severe hepatic steatosis.  3.Left upper quadrant varices and collaterals, some of which appear endoluminal within the gastric cardia.   4.Small sliding-type hiatal  hernia.        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  Patient states that he typically drinks 4-5 shots of whiskey daily but drank 1/5 of whiskey at birthday party.  His pain is improving on exam today and he has not vomited yet today.  He has not tried any methods of alcohol cessation including medications to this point, but he would like to quit drinking alcohol.     low-fat diet.  Avoid alcohol.  -MELD 3.0 score was 14 and Madrey's discriminant function was 16.3 on 1/16/2025.  Discriminant function is 14 today.  Still no need for steroids.  -Case management onboard for alcohol cessation plan to outpatient rehab post-hospital discharge  -Recommend referral to therapist for discussion of alternative techniques to manage depression and anxiety  -Continue supplementation of folate and thiamine  -Antiemetics as needed  -Recommend outpatient follow-up and EGD to better characterize possible gastric varices  -Recommend complete alcohol cessation.    Electronically signed by PAU Juárez, 01/18/25, 1:32 PM EST.

## 2025-01-19 NOTE — CASE MANAGEMENT/SOCIAL WORK
Case Management Discharge Note      Final Note: Routine home.         Selected Continued Care - Discharged on 1/18/2025 Admission date: 1/15/2025 - Discharge disposition: Home or Self Care       Transportation Services  Private: Car    Final Discharge Disposition Code: 01 - home or self-care